# Patient Record
Sex: FEMALE | Race: WHITE | NOT HISPANIC OR LATINO | Employment: OTHER | URBAN - METROPOLITAN AREA
[De-identification: names, ages, dates, MRNs, and addresses within clinical notes are randomized per-mention and may not be internally consistent; named-entity substitution may affect disease eponyms.]

---

## 2024-01-10 ENCOUNTER — APPOINTMENT (OUTPATIENT)
Dept: RADIOLOGY | Facility: OTHER | Age: 69
End: 2024-01-10
Payer: MEDICARE

## 2024-01-10 ENCOUNTER — OFFICE VISIT (OUTPATIENT)
Dept: OBGYN CLINIC | Facility: OTHER | Age: 69
End: 2024-01-10
Payer: MEDICARE

## 2024-01-10 VITALS
DIASTOLIC BLOOD PRESSURE: 80 MMHG | BODY MASS INDEX: 27.16 KG/M2 | SYSTOLIC BLOOD PRESSURE: 128 MMHG | HEIGHT: 65 IN | HEART RATE: 66 BPM | WEIGHT: 163 LBS

## 2024-01-10 DIAGNOSIS — Z01.89 ENCOUNTER FOR LOWER EXTREMITY COMPARISON IMAGING STUDY: ICD-10-CM

## 2024-01-10 DIAGNOSIS — M25.562 LEFT KNEE PAIN, UNSPECIFIED CHRONICITY: ICD-10-CM

## 2024-01-10 DIAGNOSIS — M17.0 PRIMARY OSTEOARTHRITIS OF BOTH KNEES: ICD-10-CM

## 2024-01-10 DIAGNOSIS — M17.12 PRIMARY OSTEOARTHRITIS OF LEFT KNEE: Primary | ICD-10-CM

## 2024-01-10 PROCEDURE — 73564 X-RAY EXAM KNEE 4 OR MORE: CPT

## 2024-01-10 PROCEDURE — 99204 OFFICE O/P NEW MOD 45 MIN: CPT | Performed by: ORTHOPAEDIC SURGERY

## 2024-01-10 RX ORDER — CHLORHEXIDINE GLUCONATE ORAL RINSE 1.2 MG/ML
15 SOLUTION DENTAL ONCE
OUTPATIENT
Start: 2024-01-10 | End: 2024-01-10

## 2024-01-10 RX ORDER — FERROUS SULFATE 324(65)MG
324 TABLET, DELAYED RELEASE (ENTERIC COATED) ORAL
Qty: 60 TABLET | Refills: 0 | Status: SHIPPED | OUTPATIENT
Start: 2024-01-10

## 2024-01-10 RX ORDER — LEVOTHYROXINE SODIUM 0.1 MG/1
100 TABLET ORAL
COMMUNITY

## 2024-01-10 RX ORDER — HYDROCHLOROTHIAZIDE 12.5 MG/1
12.5 TABLET ORAL DAILY
COMMUNITY

## 2024-01-10 RX ORDER — MULTIVITAMIN
1 TABLET ORAL DAILY
Qty: 30 TABLET | Refills: 0 | Status: CANCELLED | OUTPATIENT
Start: 2024-01-10

## 2024-01-10 RX ORDER — MELATONIN
1000 DAILY
COMMUNITY

## 2024-01-10 RX ORDER — VALSARTAN 80 MG/1
80 TABLET ORAL DAILY
COMMUNITY

## 2024-01-10 RX ORDER — MULTIVITAMIN
1 TABLET ORAL DAILY
Qty: 30 TABLET | Refills: 0 | Status: SHIPPED | OUTPATIENT
Start: 2024-01-10

## 2024-01-10 RX ORDER — SEMAGLUTIDE 1.34 MG/ML
INJECTION, SOLUTION SUBCUTANEOUS
COMMUNITY

## 2024-01-10 RX ORDER — FOLIC ACID 1 MG/1
1 TABLET ORAL DAILY
Qty: 30 TABLET | Refills: 0 | Status: SHIPPED | OUTPATIENT
Start: 2024-01-10

## 2024-01-10 NOTE — PROGRESS NOTES
"Orthopaedic Surgery - Office Note  Thao Ford (68 y.o. female)   : 1955   MRN: [id number]  Encounter Date: 1/10/2024    Chief Complaint   Patient presents with    Left Knee - Pain     Left knee hurts more      Right Knee - Pain       Assessment / Plan  Bilateral knee OA, pain worse L>R    Patient is electing to move forward with Left TKA  The surgical risks, benefits and alternatives were reviewed   Consent was singed during the visit  Scheduled for 2024  Referral given to attend PT to learn hip, thigh and core strengthening   Activity as tolerated  Ice, heat and anti-inflammatories prn   Ordered and reviewed XR during the visit  Return for 2 week follwo up with [employer].    History of Present Illness  Thao Ford is a 68 y.o. female who presents for evaluation of bilateral knee pain with known OA. Patient self reports OA ,\"bone on bone\" and has known this for many years. She had them injected in  with very mild relief. She has not had any formal treatments since . She does work out on her own and does know the exercises to avoid.  She doesn't have constantly but notes increases pain with prolonged WB activity. She notes her left knee will buckle on her occasionally.     Review of Systems  Pertinent items are noted in HPI.  All other systems were reviewed and are negative.    Physical Exam  /80 (BP Location: Right arm, Patient Position: Sitting, Cuff Size: Standard)   Pulse 66   Ht 5' 4.5\" (1.638 m)   Wt 73.9 kg (163 lb)   BMI 27.55 kg/m²   Cons: Appears well.  No apparent distress.  Psych: Alert. Oriented x3.  Mood and affect normal.  Eyes: PERRLA, EOMI  Resp: Normal effort.  No audible wheezing or stridor.  CV: Palpable pulse.  No discernable arrhythmia.  No LE edema.  Lymph:  No palpable cervical, axillary, or inguinal lymphadenopathy.  Skin: Warm.  No palpable masses.  No visible lesions.  Neuro: Normal muscle tone.  Normal and symmetric DTR's.     Bilateral Knee " Exam  Alignment:   mild correctable genu varus.  Inspection:  No swelling. No ecchymosis.  Palpation:  No tenderness. No effusion.  ROM:  Knee Extension 0. Knee Flexion 125.  Strength:  Able to actively extend knee against gravity.  Stability:  No objective knee instability. Stable Varus / Valgus stress, Lachman, and Posterior drawer.  Tests:  (-) Venkat.  Patella:  Patella tracks centrally with crepitus.  Neurovascular:  Sensation intact in DP/SP/Montoya/Sa/T nerve distributions.  2+ DP & PT pulses.  Gait:  Normal.     Studies Reviewed  I have personally reviewed pertinent films in PACS.  XR of left knee - images from 01/10/2024 showing severe narrowing of the medial joint space in the left knee, moderate narrowing of the right knee medial joint space, bone spurring of the patella seen bilaterally     Procedures  No procedures today.    Medical, Surgical, Family, and Social History  The patient's medical history, family history, and social history, were reviewed and updated as appropriate.    Past Medical History:   Diagnosis Date    Hypertension        No past surgical history on file.    No family history on file.    Social History     Occupational History    Not on file   Tobacco Use    Smoking status: Not on file    Smokeless tobacco: Not on file   Substance and Sexual Activity    Alcohol use: Not on file    Drug use: Not on file    Sexual activity: Not on file       Allergies   Allergen Reactions    Epinephrine Palpitations     Racey heart    Gluten Meal - Food Allergy Diarrhea    Allopurinol Other (See Comments)    Bactrim [Sulfamethoxazole-Trimethoprim] Other (See Comments)     Allergy from 23 years ago, doesn't remember reaction    Doxycycline Other (See Comments)         Current Outpatient Medications:     cholecalciferol (VITAMIN D3) 1,000 units tablet, Take 1,000 Units by mouth daily, Disp: , Rfl:     hydrochlorothiazide (HYDRODIURIL) 12.5 mg tablet, Take 12.5 mg by mouth daily, Disp: , Rfl:      levothyroxine 100 mcg tablet, Take 100 mcg by mouth, Disp: , Rfl:     Ozempic, 1 MG/DOSE, 4 MG/3ML injection pen, INJECT 1MG UNDER THE SKIN EVERY WEEK, Disp: , Rfl:     semaglutide, 0.25 or 0.5 mg/dose, (Ozempic, 0.25 or 0.5 MG/DOSE,) 2 mg/1.5 mL injection pen, Inject 1 mg under the skin, Disp: , Rfl:     TURMERIC PO, Take 1 capsule by mouth daily, Disp: , Rfl:     valsartan (DIOVAN) 80 mg tablet, Take 80 mg by mouth daily, Disp: , Rfl:       Nkechi Vidal    Scribe Attestation      I,:  Nkechi Vidal am acting as a scribe while in the presence of the attending physician.:       I,:  Akhil Carvajal MD personally performed the services described in this documentation    as scribed in my presence.:

## 2024-01-11 LAB — HBA1C MFR BLD HPLC: 5.7 %

## 2024-01-12 ENCOUNTER — TELEPHONE (OUTPATIENT)
Dept: OBGYN CLINIC | Facility: HOSPITAL | Age: 69
End: 2024-01-12

## 2024-01-12 NOTE — TELEPHONE ENCOUNTER
Spoke to patient to let her know for pre-op testing there is no EKG ordered but she is seeing her cardiologist on 2/19 and if he would like to order one she will have it done. Dr. Small her cardiologist will fax OV note to my fax number which I provided to her. Any further questions she will call.

## 2024-01-12 NOTE — TELEPHONE ENCOUNTER
Caller: Patient     Doctor: Alena    Reason for call: Patient is calling to see if she can have an EKG during her appt with Dr Wade.    Call back#: [phone]

## 2024-01-16 ENCOUNTER — EVALUATION (OUTPATIENT)
Dept: PHYSICAL THERAPY | Facility: OTHER | Age: 69
End: 2024-01-16
Payer: MEDICARE

## 2024-01-16 DIAGNOSIS — M25.562 LEFT KNEE PAIN, UNSPECIFIED CHRONICITY: ICD-10-CM

## 2024-01-16 DIAGNOSIS — M17.0 PRIMARY OSTEOARTHRITIS OF BOTH KNEES: ICD-10-CM

## 2024-01-16 PROCEDURE — 97110 THERAPEUTIC EXERCISES: CPT

## 2024-01-16 PROCEDURE — 97161 PT EVAL LOW COMPLEX 20 MIN: CPT

## 2024-01-16 NOTE — PROGRESS NOTES
PT Evaluation     Today's date: 2024  Patient name: Thao Ford  : 1955  MRN: [id number]  Referring provider: Akhil Carvajal, *  Dx:   Encounter Diagnosis     ICD-10-CM    1. Left knee pain, unspecified chronicity  M25.562 Ambulatory Referral to Physical Therapy      2. Primary osteoarthritis of both knees  M17.0 Ambulatory Referral to Physical Therapy          Start Time: 0800  Stop Time: 0900  Total time in clinic (min): 60 minutes    Assessment  Assessment details: Thao Ford is a 68 y.o. female presenting to PT for 1 pre-operative visit for a left TKA.  Risk Assessment and Prediction Tool results reviewed. Patient reported functional outcome scores reviewed. Discussed DOS and patient's questions were answered to patient's satisfaction. Mobility/ROM results per above. Strength results per above. Balance/Gait (including Timed Up & Go) per above. Virtual Home Assessment was reviewed with patient. Home Preparation Checklist was reviewed with patient including identification of care partner and encouragement of single level set-up. Post-operative pain management expectations discussed to the patient's satisfaction. Patient demonstrated competence with immediate post-operative home exercise program.  Expectation is that patient will be d/c to home with outpatient PT and will benefit from PT to address gait/strength dysfunction along with knee Range of Motion deficits to return to Prior level of functioning.   Impairments: abnormal coordination, abnormal gait, abnormal muscle firing, abnormal muscle tone, abnormal or restricted ROM, abnormal movement, activity intolerance, impaired balance, impaired physical strength, lacks appropriate home exercise program, pain with function, weight-bearing intolerance and poor body mechanics    Symptom irritability: moderateUnderstanding of Dx/Px/POC: good   Prognosis: good    Goals  Short Term Goals: to be achieved by 4 weeks  1) Patient to be  independent with basic HEP.  2) Decrease pain by 4/10 at its worst.  3) Increase knee flexion ROM to 90 deg.  4) Increase knee extension ROM by > 5 deg.  5) Increase LE strength by 1/2 MMT grade in all deficient planes.  6) Patient to negotiate steps with a step-to pattern with use of HR.   7) Patient to report decreased sleep interruption secondary to pain.  8) Increase ambulatory tolerance by 20 min with LRAD.    Long Term Goals: to be achieved by discharge  1) FOTO equal to or greater than goal.  2) Patient to be independent with comprehensive HEP.  3) Decrease pain to 3/10 or less for improved quality of life.  4) Increase LE strength to 5/5 MMT grade in all planes to improve a/iadls.  5) Achieve full knee extension ROM to improve a/iadls.  6) Increase knee flexion ROM to 120 degrees minimum to improve a/iadls.  7) Patient to negotiate steps with a reciprocal pattern with use of Hr.  8) Increase ambulatory tolerance to 40 min to improve participation in social activities.  9) Patient to report no sleep interruption secondary to pain.      Plan  Plan details: Patient to continue with HEP prior to surgery, then resume 2x/week post-surgery, and attend physical therapy as needed prior to surgery for HEP review/progression.   Patient would benefit from: PT eval and skilled physical therapy  Planned modality interventions: cryotherapy, TENS, thermotherapy: hydrocollator packs and low level laser therapy  Planned therapy interventions: manual therapy, massage, joint mobilization, coordination, graded exercise, graded activity, functional ROM exercises, therapeutic exercise, therapeutic activities, patient education, neuromuscular re-education, home exercise program and flexibility  Frequency: 2x week  Duration in weeks: 10  Plan of Care beginning date: 1/16/2024  Plan of Care expiration date: 3/26/2024  Treatment plan discussed with: patient        Subjective Evaluation    History of Present Illness  Date of surgery:  3/12/24.  Mechanism of injury: Patient is a 68 y.o. female presenting to physical therapy with chief complaint of bilateral knee pain, with her left knee being more painful. Patient reports this episode of pain/symptoms has been going on for years. Patient reports also having occasional left hip pain which she feels could be due to walking with compensatory patterns when her knee is really in pain. She reports most difficulty with walking, stair negotiation, and ADL's. Patient reports she has been attending the gym and doing exercises and will keep up with her HEP prior to surgery.  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 Descending    5x STS: Painful - held  TUG: <10 seconds, no assistance or AD from standard chair        MMT         AROM         PROM    Hip       L     R         L       R          L         R   Flex. 3+ 3+       Extn. 3+ 3+       Abd. 3+ 3+       Add.         IR.         ER.         G. Max         G. Med         Iliop.         .         Knee         Extension 4 4 0 0     Flexion 4 4 125 130              Ankle         Dorsi Flexion 5 5       Plantar Flexion           Segmental mobility:   Patella: mild hypomobility             Precautions: Hx of cancer. LBP. Hx bunionectomy.    Post-Op HEP Handout  provided with [employer].    POC expires Unit limit Auth Expiration date PT/OT + Visit Limit?   3/26/24 N/A TBA BOMN                           Visit/Unit Tracking  AUTH Status:  Date 1/16              For secondary Used 1               Remaining  TBA                Visit 1 IE pre-op        Manuals 1/16/24        Knee PROM         Patellar mobs         Soft Tissue Deformation                           Assessment         Neuro Re-Ed         Quad Set HEP        Glute Set HEP        SAQ         LAQ HEP        Glute Bridge         Clamshell HEP        SLS         Ankle pump/ABC HEP        Hip abd HEP                                   Ther Ex         Bike         Leg Press         Alternating gastroc/soleus stretch slant board         Side Stepping         Heel slide HEP                                   Ther Activity         Step Ups         Step Downs         LSU         Squatting         Stair negotiation         Outcome Measures                                    Gait Training         Walker         SPC                           Modalities

## 2024-02-07 ENCOUNTER — TELEPHONE (OUTPATIENT)
Dept: OBGYN CLINIC | Facility: HOSPITAL | Age: 69
End: 2024-02-07

## 2024-02-07 LAB
DME PARACHUTE DELIVERY DATE REQUESTED: NORMAL
DME PARACHUTE ITEM DESCRIPTION: NORMAL
DME PARACHUTE ORDER STATUS: NORMAL
DME PARACHUTE SUPPLIER NAME: NORMAL
DME PARACHUTE SUPPLIER PHONE: NORMAL

## 2024-02-07 NOTE — TELEPHONE ENCOUNTER
Preoperative Elective Admission Assessment- spoke with patient     Living Situation:    Who does pt live with: spouse  What kind of home: ranch with logarry  How do they enter the home: front  How many levels in home: 2   # of steps to enter home: 1  # of steps to second floor: 12  Are there handrails: Yes  Are there landings: Yes  Sleeping arrangement: first/entry floor  Where is Bathroom: entry level  Where is the tub or shower: walk in shower with chair   Dogs or ther pets: cat     First Floor Setup:   Is there a bathroom: Yes  Where would pt sleep: recliner     DME: ordering RW today. PROVIDED [facility] [employer] NUMBER TO CONTACT: [phone]   We discussed clearing pathways in the home and making sure there is accessibly to use the walker, for example, removing throw rugs.      Patient's Current Level of Function: Ambulates: Independently    Post-op Caregiver: spouse  Caregiver Name and phone number for Inpatient discharge needs: Thaddeus () [phone]  Currently receive any HHC/aides/community supports: No     Post-op Transport: spouse  To/from hospital: spouse  To/from PT 2-3x/week: spouse  Uses community transport now: No     Outpatient Physical Therapy Site:  Site: [place]  pre and post-op appts scheduled? Yes     Medication Management: self  Preferred Pharmacy for Post-op Medications: [id number] - [place], [place] - [address] [[id number]]   Blood Management Vitamin Regimen: Pt confirms taking as prescribed  Post-op anticoagulant: to be determined by surgical team postoperatively     DC Plan: Pt plans to be discharged home      Barriers to DC identified preoperatively: none identified    BMI: 27.55      Patient Education:  Pt educated on post-op pain, early mobilization (POD0), LOS goals, OP PT goals, and preoperative bathing. Patient educated that our goal is to appropriately discharge patient based off their post-op function while striving to maintain maximal independence. The goal is to discharge  patient to home and for them to attend outpatient physical therapy.    Assigned to care team? Yes

## 2024-02-15 LAB
DME PARACHUTE DELIVERY DATE ACTUAL: NORMAL
DME PARACHUTE DELIVERY DATE REQUESTED: NORMAL
DME PARACHUTE ITEM DESCRIPTION: NORMAL
DME PARACHUTE ORDER STATUS: NORMAL
DME PARACHUTE SUPPLIER NAME: NORMAL
DME PARACHUTE SUPPLIER PHONE: NORMAL

## 2024-02-20 ENCOUNTER — APPOINTMENT (OUTPATIENT)
Dept: LAB | Age: 69
End: 2024-02-20

## 2024-02-20 ENCOUNTER — APPOINTMENT (OUTPATIENT)
Dept: LAB | Facility: HOSPITAL | Age: 69
End: 2024-02-20
Payer: MEDICARE

## 2024-02-20 DIAGNOSIS — M17.12 PRIMARY OSTEOARTHRITIS OF LEFT KNEE: Primary | ICD-10-CM

## 2024-02-20 PROBLEM — I10 ESSENTIAL HYPERTENSION: Status: ACTIVE | Noted: 2024-02-20

## 2024-02-20 PROBLEM — E06.3 HYPOTHYROIDISM DUE TO HASHIMOTO'S THYROIDITIS: Status: ACTIVE | Noted: 2024-02-20

## 2024-02-20 PROBLEM — C92.10 CML (CHRONIC MYELOCYTIC LEUKEMIA) (HCC): Status: ACTIVE | Noted: 2017-02-27

## 2024-02-20 PROBLEM — Z94.81 BONE MARROW TRANSPLANT STATUS (HCC): Status: ACTIVE | Noted: 2017-02-27

## 2024-02-20 PROBLEM — I35.8 AORTIC VALVE SCLEROSIS: Status: ACTIVE | Noted: 2017-08-28

## 2024-02-20 PROBLEM — E03.8 HYPOTHYROIDISM DUE TO HASHIMOTO'S THYROIDITIS: Status: ACTIVE | Noted: 2024-02-20

## 2024-02-20 LAB
ALBUMIN SERPL BCP-MCNC: 4.3 G/DL (ref 3.5–5)
ALP SERPL-CCNC: 68 U/L (ref 34–104)
ALT SERPL W P-5'-P-CCNC: 21 U/L (ref 7–52)
ANION GAP SERPL CALCULATED.3IONS-SCNC: 10 MMOL/L
APTT PPP: 24 SECONDS (ref 23–37)
AST SERPL W P-5'-P-CCNC: 18 U/L (ref 13–39)
BASOPHILS # BLD AUTO: 0.04 THOUSANDS/ÂΜL (ref 0–0.1)
BASOPHILS NFR BLD AUTO: 1 % (ref 0–1)
BILIRUB SERPL-MCNC: 0.71 MG/DL (ref 0.2–1)
BUN SERPL-MCNC: 16 MG/DL (ref 5–25)
CALCIUM SERPL-MCNC: 10.2 MG/DL (ref 8.4–10.2)
CHLORIDE SERPL-SCNC: 101 MMOL/L (ref 96–108)
CO2 SERPL-SCNC: 26 MMOL/L (ref 21–32)
CREAT SERPL-MCNC: 0.84 MG/DL (ref 0.6–1.3)
EOSINOPHIL # BLD AUTO: 0.08 THOUSAND/ÂΜL (ref 0–0.61)
EOSINOPHIL NFR BLD AUTO: 1 % (ref 0–6)
ERYTHROCYTE [DISTWIDTH] IN BLOOD BY AUTOMATED COUNT: 13.5 % (ref 11.6–15.1)
EST. AVERAGE GLUCOSE BLD GHB EST-MCNC: 114 MG/DL
GFR SERPL CREATININE-BSD FRML MDRD: 71 ML/MIN/1.73SQ M
GLUCOSE P FAST SERPL-MCNC: 92 MG/DL (ref 65–99)
HBA1C MFR BLD: 5.6 %
HCT VFR BLD AUTO: 41.5 % (ref 34.8–46.1)
HGB BLD-MCNC: 13.8 G/DL (ref 11.5–15.4)
IMM GRANULOCYTES # BLD AUTO: 0.01 THOUSAND/UL (ref 0–0.2)
IMM GRANULOCYTES NFR BLD AUTO: 0 % (ref 0–2)
INR PPP: 0.95 (ref 0.84–1.19)
LYMPHOCYTES # BLD AUTO: 2.46 THOUSANDS/ÂΜL (ref 0.6–4.47)
LYMPHOCYTES NFR BLD AUTO: 38 % (ref 14–44)
MCH RBC QN AUTO: 32.1 PG (ref 26.8–34.3)
MCHC RBC AUTO-ENTMCNC: 33.3 G/DL (ref 31.4–37.4)
MCV RBC AUTO: 97 FL (ref 82–98)
MONOCYTES # BLD AUTO: 0.51 THOUSAND/ÂΜL (ref 0.17–1.22)
MONOCYTES NFR BLD AUTO: 8 % (ref 4–12)
NEUTROPHILS # BLD AUTO: 3.44 THOUSANDS/ÂΜL (ref 1.85–7.62)
NEUTS SEG NFR BLD AUTO: 52 % (ref 43–75)
NRBC BLD AUTO-RTO: 0 /100 WBCS
PLATELET # BLD AUTO: 297 THOUSANDS/UL (ref 149–390)
PMV BLD AUTO: 9.9 FL (ref 8.9–12.7)
POTASSIUM SERPL-SCNC: 4.9 MMOL/L (ref 3.5–5.3)
PROT SERPL-MCNC: 7.2 G/DL (ref 6.4–8.4)
PROTHROMBIN TIME: 12.6 SECONDS (ref 11.6–14.5)
RBC # BLD AUTO: 4.3 MILLION/UL (ref 3.81–5.12)
SODIUM SERPL-SCNC: 137 MMOL/L (ref 135–147)
WBC # BLD AUTO: 6.54 THOUSAND/UL (ref 4.31–10.16)

## 2024-02-20 PROCEDURE — 85025 COMPLETE CBC W/AUTO DIFF WBC: CPT

## 2024-02-20 PROCEDURE — 85730 THROMBOPLASTIN TIME PARTIAL: CPT

## 2024-02-20 PROCEDURE — 36415 COLL VENOUS BLD VENIPUNCTURE: CPT

## 2024-02-20 PROCEDURE — 83036 HEMOGLOBIN GLYCOSYLATED A1C: CPT

## 2024-02-20 PROCEDURE — 80053 COMPREHEN METABOLIC PANEL: CPT

## 2024-02-20 PROCEDURE — 85610 PROTHROMBIN TIME: CPT

## 2024-02-20 RX ORDER — ACETAMINOPHEN 500 MG
500 TABLET ORAL
COMMUNITY

## 2024-02-20 NOTE — PRE-PROCEDURE INSTRUCTIONS
Pre-Surgery Instructions:   Medication Instructions    acetaminophen (TYLENOL) 500 mg tablet Uses PRN- OK to take day of surgery    ascorbic Acid (VITAMIN C) 500 MG CPCR Hold day of surgery.    cholecalciferol (VITAMIN D3) 1,000 units tablet Stop taking 7 days prior to surgery.    ferrous sulfate 324 (65 Fe) mg Hold day of surgery.    folic acid (KP Folic Acid) 1 mg tablet Hold day of surgery.    hydrochlorothiazide (HYDRODIURIL) 12.5 mg tablet Hold day of surgery.    Ibuprofen-diphenhydrAMINE Cit 200-38 MG TABS Stop taking 3 days prior to surgery.    levothyroxine 100 mcg tablet Take day of surgery.    Multiple Vitamin (multivitamin) tablet Hold day of surgery.    Ozempic, 1 MG/DOSE, 4 MG/3ML injection pen Stop taking 7 days prior to surgery.    TURMERIC PO Stop taking 7 days prior to surgery.    valsartan (DIOVAN) 80 mg tablet Hold day of surgery.   Ortho class complete.    Medication instructions for day surgery reviewed. Please use only a sip of water to take your instructed medications. Avoid all over the counter vitamins, supplements and NSAIDS for one week prior to surgery per anesthesia guidelines. Tylenol is ok to take as needed.     You will receive a call one business day prior to surgery with an arrival time and hospital directions. If your surgery is scheduled on a Monday, the hospital will be calling you on the Friday prior to your surgery. If you have not heard from anyone by 8pm, please call the hospital supervisor through the hospital  at [phone]. ([place] [phone] or [place] [phone]).    Do not eat or drink anything after midnight the night before your surgery, including candy, mints, lifesavers, or chewing gum. Do not drink alcohol 24hrs before your surgery. Try not to smoke at least 24hrs before your surgery.       Follow the pre surgery showering instructions as listed in the “My Surgical Experience Booklet” or otherwise provided by your surgeon's office. Do not use a  blade to shave the surgical area 1 week before surgery. It is okay to use a clean electric clippers up to 24 hours before surgery. Do not apply any lotions, creams, including makeup, cologne, deodorant, or perfumes after showering on the day of your surgery. Do not use dry shampoo, hair spray, hair gel, or any type of hair products.     No contact lenses, eye make-up, or artificial eyelashes. Remove nail polish, including gel polish, and any artificial, gel, or acrylic nails if possible. Remove all jewelry including rings and body piercing jewelry.     Wear causal clothing that is easy to take on and off. Consider your type of surgery.    Keep any valuables, jewelry, piercings at home. Please bring any specially ordered equipment (sling, braces) if indicated.    Arrange for a responsible person to drive you to and from the hospital on the day of your surgery. Please confirm the visitor policy for the day of your procedure when you receive your phone call with an arrival time.     Call the surgeon's office with any new illnesses, exposures, or additional questions prior to surgery.    Please reference your “My Surgical Experience Booklet” for additional information to prepare for your upcoming surgery.

## 2024-02-20 NOTE — PATIENT INSTRUCTIONS
Contact surgical nurse  navigator with any questions regarding preoperative plan or schedule.  Stop all over the counter supplements, herbal, naturopathic  medications for 1 week prior to surgery UNLESS prescribed by your surgeon  Hold NSAIDS (i.e. advil, alleve, motrin, ibuprofen, celebrex) minimum 7 days prior to surgery  Hold Asprin minimum 7 days prior to surgery  Recommend using Tylenol ( acetaminophen ) 500mg every eight hours during the first week post discharge in conjunction with any additional pain medicine prescribed by your surgeon  Use bowel medicines prescribed by your surgeon ( colace) daily post op during the first 1-2 weeks to avoid post operative constipation issues  Call [phone] with any post discharge concerns or medical issues  The morning of surgery take only the following medication with small sip of water: Levothyroxine  Hold semaglutide 1 week prior to surgery  Hold valsartan and hydrochlorothiazide morning of surgery

## 2024-02-20 NOTE — PROGRESS NOTES
Internal Medicine Pre-Operative Evaluation:     Reason for Visit: Pre-operative Evaluation for Risk Stratification and Optimization    Patient ID: Thao Ford is a 68 y.o. female.     Surgery: Arthroplasty of left knee  Referring Provider: Dr. Carvajal      Recommendations to Proceed withSurgery    Patient is considered to be Low risk for Medium risk procedure.     After evaluation and discussion with patient with emphasis that all surgery has some degree of inherent risk it is determined this procedure is of acceptable risk  medically.    Patient may proceed with planned procedure      Assessment    Pre-operative Medical Evaluation for planned surgery  Recommendations as listed in PLAN section below  Contact surgical nurse  navigator with any questions regarding preoperative plan or schedule.      Problem List Items Addressed This Visit          Unprioritized    Hypothyroidism due to Hashimoto's thyroiditis     Continue current thyroid supplement           Bone marrow transplant status (HCC)-ongoing follow-up with primary physician    Aortic valve sclerosis  Patient cleared by cardiology for surgery on 2/19/2024    CML (chronic myelocytic leukemia) (HCC)-history     Other Visit Diagnoses       Preop exam for internal medicine    -  Primary                 Plan:     1. Further preoperative workup as follows:   - none no further testing required may proceed with surgery    2. Medication Management/Recommendations:   - hold ACE / ARB morning of surgery unless given other instructions by your provider  - hold diuretic / water pill  morning of surgery unless given other instructions by your provider  - Insulin Management: Hold semaglutide 1 week prior to surgery  - avoid use of NSAID such as motrin, advil, aleve for 7 days prior to surgery  - hold all OTC herbal or nutritional supplements 7 days before surgery    3. Patient requires further consultation with:   No Consults Required    4. Discharge Planning /  "Barriers to Discharge  none identified - patients has post discharge therapy plan in place, transportation arranged for discharge day, adequate family support at home to assist with discharge to home.        Subjective:           History of Present Illness:     Thao Ford is a 68 y.o. female who presents to the office today for a preoperative consultation at the request of surgeon. The patient understands this is an elective procedure and not emergent. They are electing to undergo planned procedure with an understanding that all surgery has inherent risk. They have worked with their surgeon and failed conservative treatment measures. Today they present for preoperative risk assessment and recommendations for optimization in preparation for surgery.    Pt seen in surgical optimization center for upcoming proposed surgery. They have failed previous conservative measures and have elected surgical intervention.     Pt meets presurgical lab and BMI optimization goals.    Upon interview questioning patient is able to perform greater than 4 METs workload in daily life without any reported cardio-pulmonary symptoms.        ROS: No TIA's or unusual headaches, no dysphagia.  No prolonged cough. No dyspnea or chest pain on exertion.  No abdominal pain, change in bowel habits, black or bloody stools.  No urinary tract or BPH symptoms.  Positive reported pain in arthritic joint. Positive difficulty with gait. No skin rashes or issues.      Objective:    /68   Pulse 80   Ht 5' 4.5\" (1.638 m)   Wt 73.9 kg (163 lb)   BMI 27.55 kg/m²       General Appearance: no distress, conversive  HEENT: PERRLA, conjuctiva normal; oropharynx clear; mucous membranes moist;   Neck:  Supple, no lymphadenopathy or thyromegaly  Lungs: breath sounds normal, normal respiratory effort, no retractions, expiratory effort normal  CV: normal heart sounds S1/S2, PMI normal   ABD: soft non tender, no masses , no hepatic or splenomegaly  EXT: DP " pulses intact, no lymphadenopathy, no edema  Skin: normal turgor, normal texture, no rash  Psych: affect normal, mood normal  Neuro: AAOx3        The following portions of the patient's history were reviewed and updated as appropriate: allergies, current medications, past family history, past medical history, past social history, past surgical history and problem list.     Past History:       Past Medical History:   Diagnosis Date   • CML (chronic myelocytic leukemia) (HCC) 2000   • Disease of thyroid gland    • GERD (gastroesophageal reflux disease)    • Herniated lumbar intervertebral disc     L5-S1   • History of transfusion    • Hypertension    • Septicemia (HCC)     during admission for stem cell transplant    Past Surgical History:   Procedure Laterality Date   • BUNIONECTOMY Left    •  SECTION     • COLONOSCOPY     • LIMBAL STEM CELL TRANSPLANT            Social History     Tobacco Use   • Smoking status: Never   • Smokeless tobacco: Never   Vaping Use   • Vaping status: Never Used   Substance Use Topics   • Alcohol use: Yes     Comment: rarely   • Drug use: Never     History reviewed. No pertinent family history.       Allergies:     Allergies   Allergen Reactions   • Epinephrine Palpitations     Racey heart   • Gluten Meal - Food Allergy Diarrhea   • Bactrim [Sulfamethoxazole-Trimethoprim] Other (See Comments)     Allergy from 23 years ago, doesn't remember reaction   • Doxycycline Facial Swelling   • Allopurinol Rash   • Sulfites - Food Allergy Rash        Current Medications:     Current Outpatient Medications   Medication Instructions   • acetaminophen (TYLENOL) 500 mg, Oral, Daily at bedtime PRN   • ascorbic Acid (VITAMIN C) 500 mg, Oral, Daily   • cholecalciferol 2,000 Units, Oral, Daily   • ferrous sulfate 324 mg, Oral, 2 times daily before meals   • folic acid (KP FOLIC ACID) 1 mg, Oral, Daily   • hydroCHLOROthiazide 12.5 mg, Oral, Daily   • Ibuprofen-diphenhydrAMINE Cit 200-38 MG  "TABS Daily at bedtime PRN   • levothyroxine 100 mcg, Oral, Daily   • Multiple Vitamin (multivitamin) tablet 1 tablet, Oral, Daily   • Ozempic, 1 MG/DOSE, 4 MG/3ML injection pen INJECT 1MG UNDER THE SKIN EVERY WEEK   • semaglutide (0.25 or 0.5 mg/dose) (OZEMPIC (0.25 OR 0.5 MG/DOSE)) 1 mg, Subcutaneous   • TURMERIC PO 1 capsule, Oral, Daily   • valsartan (DIOVAN) 80 mg, Oral, Daily           PRE-OP WORKSHEET DATA    Assessment of Pre-Operative Risks     MLJ Quality Hard Stops:  BMI (<40) : Estimated body mass index is 27.55 kg/m² as calculated from the following:    Height as of this encounter: 5' 4.5\" (1.638 m).    Weight as of this encounter: 73.9 kg (163 lb).    Hgb ( >11):   Lab Results   Component Value Date    HGB 13.8 02/20/2024     HbA1c (<7.5) :   Lab Results   Component Value Date    HGBA1C 5.6 02/20/2024     GFR (>60) (Less then 45 = Nephrology consult):  CrCl cannot be calculated (Patient's most recent lab result is older than the maximum 7 days allowed.).      Active Decompensated Chronic Conditions which would delay surgery  No acutely decompensated medical issues such as recent CVA, MI, new onset arrhythmia, severe aortic stenosis, CHF, uncontrolled COPD       Exercise Capacity: (if less the 4 mets consider functional assessment via cardiac stress testing or consultation)    Able to walk 2 blocks without symptoms?: Yes  Able to walk 1 flights without symptoms?: Yes    Assessment of intra and post operative respiratory, hemodynamic and thrombotic risks     Prior Anesthesia Reactions: No     Personal history of venous thromboembolic disease? No    History of steroid use > 5 mg for >2 weeks within last year? No      The patient's risk factors for cardiac complications include :  none    Thao Ford has an IN HOSPITAL cardiac risk of RCI RISK CLASS I (0 risk factors, risk of major cardiac compl. appr. 0.5%) based on RCRI calculator    Cardiac Risk Estimation: per the Revised Cardiac Risk Index (Circ. " 100:1043, 1999),        Pre-Op Data Reviewed:       Laboratory Results: I have personally reviewed the pertinent laboratory results/reports     EKG:I have personally reviewed pertinent reports.  . I personally reviewed and interpreted available tracings in the electronic medical record    Pending to be done at cardiac clearance appointment    OLD RECORDS: reviewed old records in the chart review section if EHR on day of visit.    Previous cardiopulmonary studies within the past year:  Echocardiogram: no  Cardiac Catheterization: no  Stress Test: no      Time of visit including pre-visit chart review, visit and post-visit coordination of plan and care , review of pre-surgical lab work, preparation and time spent documenting note in electronic medical record, time spent face-to-face in physical examination answering patient questions by care team 35 minutes             [facility]

## 2024-02-21 ENCOUNTER — ANESTHESIA EVENT (OUTPATIENT)
Age: 69
End: 2024-02-21
Payer: MEDICARE

## 2024-02-28 ENCOUNTER — OFFICE VISIT (OUTPATIENT)
Age: 69
End: 2024-02-28
Payer: MEDICARE

## 2024-02-28 VITALS
HEART RATE: 80 BPM | HEIGHT: 65 IN | BODY MASS INDEX: 27.16 KG/M2 | DIASTOLIC BLOOD PRESSURE: 68 MMHG | SYSTOLIC BLOOD PRESSURE: 115 MMHG | WEIGHT: 163 LBS

## 2024-02-28 DIAGNOSIS — Z94.81 BONE MARROW TRANSPLANT STATUS (HCC): ICD-10-CM

## 2024-02-28 DIAGNOSIS — E03.8 HYPOTHYROIDISM DUE TO HASHIMOTO'S THYROIDITIS: ICD-10-CM

## 2024-02-28 DIAGNOSIS — Z01.818 PREOP EXAM FOR INTERNAL MEDICINE: Primary | ICD-10-CM

## 2024-02-28 DIAGNOSIS — E06.3 HYPOTHYROIDISM DUE TO HASHIMOTO'S THYROIDITIS: ICD-10-CM

## 2024-02-28 DIAGNOSIS — I35.8 AORTIC VALVE SCLEROSIS: ICD-10-CM

## 2024-02-28 DIAGNOSIS — C92.10 CML (CHRONIC MYELOCYTIC LEUKEMIA) (HCC): ICD-10-CM

## 2024-02-28 DIAGNOSIS — M17.12 PRIMARY OSTEOARTHRITIS OF LEFT KNEE: ICD-10-CM

## 2024-02-28 PROCEDURE — 99214 OFFICE O/P EST MOD 30 MIN: CPT | Performed by: INTERNAL MEDICINE

## 2024-03-04 ENCOUNTER — APPOINTMENT (OUTPATIENT)
Dept: LAB | Age: 69
End: 2024-03-04
Payer: MEDICARE

## 2024-03-04 DIAGNOSIS — Z01.818 PRE-OP TESTING: ICD-10-CM

## 2024-03-04 PROCEDURE — 86901 BLOOD TYPING SEROLOGIC RH(D): CPT | Performed by: ORTHOPAEDIC SURGERY

## 2024-03-04 PROCEDURE — 86850 RBC ANTIBODY SCREEN: CPT | Performed by: ORTHOPAEDIC SURGERY

## 2024-03-04 PROCEDURE — 86900 BLOOD TYPING SEROLOGIC ABO: CPT | Performed by: ORTHOPAEDIC SURGERY

## 2024-03-04 PROCEDURE — 36415 COLL VENOUS BLD VENIPUNCTURE: CPT

## 2024-03-05 ENCOUNTER — LAB REQUISITION (OUTPATIENT)
Dept: LAB | Facility: HOSPITAL | Age: 69
End: 2024-03-05
Payer: MEDICARE

## 2024-03-05 DIAGNOSIS — Z01.818 ENCOUNTER FOR OTHER PREPROCEDURAL EXAMINATION: ICD-10-CM

## 2024-03-05 LAB
ABO GROUP BLD: NORMAL
BLD GP AB SCN SERPL QL: NEGATIVE
RH BLD: POSITIVE
SPECIMEN EXPIRATION DATE: NORMAL

## 2024-03-12 ENCOUNTER — ANESTHESIA (OUTPATIENT)
Age: 69
End: 2024-03-12
Payer: MEDICARE

## 2024-03-12 ENCOUNTER — HOSPITAL ENCOUNTER (OUTPATIENT)
Age: 69
Setting detail: OUTPATIENT SURGERY
Discharge: HOME | End: 2024-03-12
Attending: ORTHOPAEDIC SURGERY | Admitting: ORTHOPAEDIC SURGERY
Payer: MEDICARE

## 2024-03-12 VITALS
HEIGHT: 64 IN | SYSTOLIC BLOOD PRESSURE: 112 MMHG | WEIGHT: 163 LBS | DIASTOLIC BLOOD PRESSURE: 64 MMHG | HEART RATE: 88 BPM | BODY MASS INDEX: 27.83 KG/M2 | TEMPERATURE: 98.4 F | OXYGEN SATURATION: 99 % | RESPIRATION RATE: 18 BRPM

## 2024-03-12 DIAGNOSIS — M17.12 PRIMARY OSTEOARTHRITIS OF LEFT KNEE: Primary | ICD-10-CM

## 2024-03-12 DIAGNOSIS — Z01.818 PRE-OP TESTING: ICD-10-CM

## 2024-03-12 PROCEDURE — C9290 INJ, BUPIVACAINE LIPOSOME: HCPCS | Performed by: ANESTHESIOLOGY

## 2024-03-12 PROCEDURE — 97166 OT EVAL MOD COMPLEX 45 MIN: CPT

## 2024-03-12 PROCEDURE — C1776 JOINT DEVICE (IMPLANTABLE): HCPCS | Performed by: ORTHOPAEDIC SURGERY

## 2024-03-12 PROCEDURE — 97163 PT EVAL HIGH COMPLEX 45 MIN: CPT

## 2024-03-12 PROCEDURE — 97535 SELF CARE MNGMENT TRAINING: CPT

## 2024-03-12 PROCEDURE — C1713 ANCHOR/SCREW BN/BN,TIS/BN: HCPCS | Performed by: ORTHOPAEDIC SURGERY

## 2024-03-12 PROCEDURE — 97530 THERAPEUTIC ACTIVITIES: CPT

## 2024-03-12 PROCEDURE — 27447 TOTAL KNEE ARTHROPLASTY: CPT | Performed by: PHYSICIAN ASSISTANT

## 2024-03-12 PROCEDURE — S2900 ROBOTIC SURGICAL SYSTEM: HCPCS | Performed by: ORTHOPAEDIC SURGERY

## 2024-03-12 PROCEDURE — 27447 TOTAL KNEE ARTHROPLASTY: CPT | Performed by: ORTHOPAEDIC SURGERY

## 2024-03-12 DEVICE — ATTUNE KNEE SYSTEM TIBIAL BASE AFFIXIUM FIXED BEARING SIZE 4
Type: IMPLANTABLE DEVICE | Site: KNEE | Status: FUNCTIONAL
Brand: ATTUNE AFFIXIUM

## 2024-03-12 DEVICE — ATTUNE PATELLA MEDIALIZED DOME 38MM CEMENTED AOX
Type: IMPLANTABLE DEVICE | Site: KNEE | Status: FUNCTIONAL
Brand: ATTUNE

## 2024-03-12 DEVICE — ATTUNE KNEE SYSTEM TIBIAL INSERT FIXED BEARING POSTERIOR STABILIZED 4 6MM AOX
Type: IMPLANTABLE DEVICE | Site: KNEE | Status: FUNCTIONAL
Brand: ATTUNE

## 2024-03-12 DEVICE — ATTUNE FEMORAL POROCOAT POSTERIOR STABILIZED SIZE 4 LEFT CEMENTLESS
Type: IMPLANTABLE DEVICE | Site: KNEE | Status: FUNCTIONAL
Brand: ATTUNE

## 2024-03-12 DEVICE — DEPUY CMW 2 FAST SET BONE CEMENT 20G: Type: IMPLANTABLE DEVICE | Site: KNEE | Status: FUNCTIONAL

## 2024-03-12 RX ORDER — SODIUM CHLORIDE, SODIUM LACTATE, POTASSIUM CHLORIDE, CALCIUM CHLORIDE 600; 310; 30; 20 MG/100ML; MG/100ML; MG/100ML; MG/100ML
INJECTION, SOLUTION INTRAVENOUS CONTINUOUS PRN
Status: DISCONTINUED | OUTPATIENT
Start: 2024-03-12 | End: 2024-03-12

## 2024-03-12 RX ORDER — ASPIRIN 81 MG/1
81 TABLET ORAL 2 TIMES DAILY
Qty: 28 TABLET | Refills: 0 | Status: SHIPPED | OUTPATIENT
Start: 2024-03-12 | End: 2024-03-12

## 2024-03-12 RX ORDER — PANTOPRAZOLE SODIUM 40 MG/1
40 TABLET, DELAYED RELEASE ORAL
Status: DISCONTINUED | OUTPATIENT
Start: 2024-03-13 | End: 2024-03-12 | Stop reason: HOSPADM

## 2024-03-12 RX ORDER — ONDANSETRON 2 MG/ML
4 INJECTION INTRAMUSCULAR; INTRAVENOUS EVERY 6 HOURS PRN
Status: DISCONTINUED | OUTPATIENT
Start: 2024-03-12 | End: 2024-03-12 | Stop reason: HOSPADM

## 2024-03-12 RX ORDER — ONDANSETRON 2 MG/ML
4 INJECTION INTRAMUSCULAR; INTRAVENOUS ONCE AS NEEDED
Status: DISCONTINUED | OUTPATIENT
Start: 2024-03-12 | End: 2024-03-12 | Stop reason: HOSPADM

## 2024-03-12 RX ORDER — HYDROMORPHONE HCL/PF 1 MG/ML
0.5 SYRINGE (ML) INJECTION EVERY 2 HOUR PRN
Status: DISCONTINUED | OUTPATIENT
Start: 2024-03-12 | End: 2024-03-12 | Stop reason: HOSPADM

## 2024-03-12 RX ORDER — FERROUS SULFATE 325(65) MG
325 TABLET ORAL 2 TIMES DAILY WITH MEALS
Status: DISCONTINUED | OUTPATIENT
Start: 2024-03-12 | End: 2024-03-12 | Stop reason: HOSPADM

## 2024-03-12 RX ORDER — FOLIC ACID 1 MG/1
1 TABLET ORAL DAILY
Status: DISCONTINUED | OUTPATIENT
Start: 2024-03-12 | End: 2024-03-12 | Stop reason: HOSPADM

## 2024-03-12 RX ORDER — SIMETHICONE 80 MG
80 TABLET,CHEWABLE ORAL 4 TIMES DAILY PRN
Status: DISCONTINUED | OUTPATIENT
Start: 2024-03-12 | End: 2024-03-12 | Stop reason: HOSPADM

## 2024-03-12 RX ORDER — OXYCODONE HYDROCHLORIDE 5 MG/1
5 TABLET ORAL EVERY 4 HOURS PRN
Status: DISCONTINUED | OUTPATIENT
Start: 2024-03-12 | End: 2024-03-12 | Stop reason: HOSPADM

## 2024-03-12 RX ORDER — HYDROMORPHONE HCL IN WATER/PF 6 MG/30 ML
0.2 PATIENT CONTROLLED ANALGESIA SYRINGE INTRAVENOUS
Status: DISCONTINUED | OUTPATIENT
Start: 2024-03-12 | End: 2024-03-12 | Stop reason: HOSPADM

## 2024-03-12 RX ORDER — BUPIVACAINE HYDROCHLORIDE 5 MG/ML
INJECTION, SOLUTION EPIDURAL; INTRACAUDAL
Status: COMPLETED | OUTPATIENT
Start: 2024-03-12 | End: 2024-03-12

## 2024-03-12 RX ORDER — CEFAZOLIN SODIUM 1 G/50ML
1000 SOLUTION INTRAVENOUS EVERY 8 HOURS
Status: DISCONTINUED | OUTPATIENT
Start: 2024-03-12 | End: 2024-03-12 | Stop reason: HOSPADM

## 2024-03-12 RX ORDER — DIPHENHYDRAMINE HYDROCHLORIDE 50 MG/ML
12.5 INJECTION INTRAMUSCULAR; INTRAVENOUS ONCE AS NEEDED
Status: DISCONTINUED | OUTPATIENT
Start: 2024-03-12 | End: 2024-03-12 | Stop reason: HOSPADM

## 2024-03-12 RX ORDER — FENTANYL CITRATE/PF 50 MCG/ML
50 SYRINGE (ML) INJECTION
Status: DISCONTINUED | OUTPATIENT
Start: 2024-03-12 | End: 2024-03-12 | Stop reason: HOSPADM

## 2024-03-12 RX ORDER — CALCIUM CARBONATE 500 MG/1
1000 TABLET, CHEWABLE ORAL DAILY PRN
Status: DISCONTINUED | OUTPATIENT
Start: 2024-03-12 | End: 2024-03-12 | Stop reason: HOSPADM

## 2024-03-12 RX ORDER — MIDAZOLAM HYDROCHLORIDE 2 MG/2ML
INJECTION, SOLUTION INTRAMUSCULAR; INTRAVENOUS
Status: COMPLETED | OUTPATIENT
Start: 2024-03-12 | End: 2024-03-12

## 2024-03-12 RX ORDER — OXYCODONE HYDROCHLORIDE 10 MG/1
10 TABLET ORAL EVERY 4 HOURS PRN
Status: DISCONTINUED | OUTPATIENT
Start: 2024-03-12 | End: 2024-03-12 | Stop reason: HOSPADM

## 2024-03-12 RX ORDER — LIDOCAINE HYDROCHLORIDE 10 MG/ML
INJECTION, SOLUTION EPIDURAL; INFILTRATION; INTRACAUDAL; PERINEURAL AS NEEDED
Status: DISCONTINUED | OUTPATIENT
Start: 2024-03-12 | End: 2024-03-12

## 2024-03-12 RX ORDER — CEFAZOLIN SODIUM 1 G/50ML
1000 SOLUTION INTRAVENOUS ONCE
Status: COMPLETED | OUTPATIENT
Start: 2024-03-12 | End: 2024-03-12

## 2024-03-12 RX ORDER — GABAPENTIN 300 MG/1
300 CAPSULE ORAL
Status: DISCONTINUED | OUTPATIENT
Start: 2024-03-12 | End: 2024-03-12 | Stop reason: HOSPADM

## 2024-03-12 RX ORDER — CEPHALEXIN 500 MG/1
1000 CAPSULE ORAL EVERY 8 HOURS SCHEDULED
Qty: 4 CAPSULE | Refills: 0 | Status: SHIPPED | OUTPATIENT
Start: 2024-03-12 | End: 2024-03-12

## 2024-03-12 RX ORDER — ASPIRIN 81 MG/1
81 TABLET ORAL 2 TIMES DAILY
Qty: 60 TABLET | Refills: 0 | Status: SHIPPED | OUTPATIENT
Start: 2024-03-12 | End: 2024-04-11

## 2024-03-12 RX ORDER — LIDOCAINE HYDROCHLORIDE 10 MG/ML
0.5 INJECTION, SOLUTION EPIDURAL; INFILTRATION; INTRACAUDAL; PERINEURAL ONCE AS NEEDED
Status: DISCONTINUED | OUTPATIENT
Start: 2024-03-12 | End: 2024-03-12 | Stop reason: HOSPADM

## 2024-03-12 RX ORDER — METOCLOPRAMIDE HYDROCHLORIDE 5 MG/ML
10 INJECTION INTRAMUSCULAR; INTRAVENOUS ONCE AS NEEDED
Status: DISCONTINUED | OUTPATIENT
Start: 2024-03-12 | End: 2024-03-12 | Stop reason: HOSPADM

## 2024-03-12 RX ORDER — HYDROMORPHONE HCL/PF 1 MG/ML
0.5 SYRINGE (ML) INJECTION
Status: DISCONTINUED | OUTPATIENT
Start: 2024-03-12 | End: 2024-03-12 | Stop reason: HOSPADM

## 2024-03-12 RX ORDER — ACETAMINOPHEN 325 MG/1
650 TABLET ORAL EVERY 6 HOURS SCHEDULED
Status: DISCONTINUED | OUTPATIENT
Start: 2024-03-12 | End: 2024-03-12 | Stop reason: HOSPADM

## 2024-03-12 RX ORDER — PROPOFOL 10 MG/ML
INJECTION, EMULSION INTRAVENOUS CONTINUOUS PRN
Status: DISCONTINUED | OUTPATIENT
Start: 2024-03-12 | End: 2024-03-12

## 2024-03-12 RX ORDER — OXYCODONE HYDROCHLORIDE 5 MG/1
5 TABLET ORAL EVERY 4 HOURS PRN
Qty: 45 TABLET | Refills: 0 | Status: SHIPPED | OUTPATIENT
Start: 2024-03-12 | End: 2024-03-22

## 2024-03-12 RX ORDER — ONDANSETRON 4 MG/1
4 TABLET, ORALLY DISINTEGRATING ORAL EVERY 8 HOURS PRN
Qty: 15 TABLET | Refills: 0 | Status: SHIPPED | OUTPATIENT
Start: 2024-03-12

## 2024-03-12 RX ORDER — CEPHALEXIN 500 MG/1
1000 CAPSULE ORAL EVERY 12 HOURS SCHEDULED
Qty: 4 CAPSULE | Refills: 0 | Status: SHIPPED | OUTPATIENT
Start: 2024-03-12 | End: 2024-03-13

## 2024-03-12 RX ORDER — PROPOFOL 10 MG/ML
INJECTION, EMULSION INTRAVENOUS AS NEEDED
Status: DISCONTINUED | OUTPATIENT
Start: 2024-03-12 | End: 2024-03-12

## 2024-03-12 RX ORDER — ASCORBIC ACID 500 MG
500 TABLET ORAL 2 TIMES DAILY
Status: DISCONTINUED | OUTPATIENT
Start: 2024-03-12 | End: 2024-03-12 | Stop reason: HOSPADM

## 2024-03-12 RX ORDER — CHLORHEXIDINE GLUCONATE ORAL RINSE 1.2 MG/ML
15 SOLUTION DENTAL ONCE
Status: COMPLETED | OUTPATIENT
Start: 2024-03-12 | End: 2024-03-12

## 2024-03-12 RX ORDER — OXYCODONE HYDROCHLORIDE 5 MG/1
5 TABLET ORAL EVERY 4 HOURS PRN
Qty: 30 TABLET | Refills: 0 | Status: SHIPPED | OUTPATIENT
Start: 2024-03-12 | End: 2024-03-12

## 2024-03-12 RX ORDER — DEXAMETHASONE SODIUM PHOSPHATE 10 MG/ML
INJECTION, SOLUTION INTRAMUSCULAR; INTRAVENOUS AS NEEDED
Status: DISCONTINUED | OUTPATIENT
Start: 2024-03-12 | End: 2024-03-12

## 2024-03-12 RX ORDER — TRANEXAMIC ACID 10 MG/ML
1000 INJECTION, SOLUTION INTRAVENOUS ONCE
Status: COMPLETED | OUTPATIENT
Start: 2024-03-12 | End: 2024-03-12

## 2024-03-12 RX ORDER — SENNOSIDES 8.6 MG
1 TABLET ORAL DAILY
Status: DISCONTINUED | OUTPATIENT
Start: 2024-03-12 | End: 2024-03-12 | Stop reason: HOSPADM

## 2024-03-12 RX ORDER — TRAMADOL HYDROCHLORIDE 50 MG/1
50 TABLET ORAL EVERY 6 HOURS SCHEDULED
Status: DISCONTINUED | OUTPATIENT
Start: 2024-03-12 | End: 2024-03-12 | Stop reason: HOSPADM

## 2024-03-12 RX ORDER — DOCUSATE SODIUM 100 MG/1
100 CAPSULE, LIQUID FILLED ORAL 2 TIMES DAILY
Status: DISCONTINUED | OUTPATIENT
Start: 2024-03-12 | End: 2024-03-12 | Stop reason: HOSPADM

## 2024-03-12 RX ORDER — SODIUM CHLORIDE, SODIUM LACTATE, POTASSIUM CHLORIDE, CALCIUM CHLORIDE 600; 310; 30; 20 MG/100ML; MG/100ML; MG/100ML; MG/100ML
75 INJECTION, SOLUTION INTRAVENOUS CONTINUOUS
Status: DISCONTINUED | OUTPATIENT
Start: 2024-03-12 | End: 2024-03-12 | Stop reason: HOSPADM

## 2024-03-12 RX ORDER — ONDANSETRON 2 MG/ML
INJECTION INTRAMUSCULAR; INTRAVENOUS AS NEEDED
Status: DISCONTINUED | OUTPATIENT
Start: 2024-03-12 | End: 2024-03-12

## 2024-03-12 RX ORDER — ACETAMINOPHEN 325 MG/1
650 TABLET ORAL EVERY 4 HOURS PRN
Status: DISCONTINUED | OUTPATIENT
Start: 2024-03-12 | End: 2024-03-12 | Stop reason: HOSPADM

## 2024-03-12 RX ADMIN — BUPIVACAINE 15 ML: 13.3 INJECTION, SUSPENSION, LIPOSOMAL INFILTRATION at 10:03

## 2024-03-12 RX ADMIN — MIDAZOLAM 2 MG: 1 INJECTION INTRAMUSCULAR; INTRAVENOUS at 10:01

## 2024-03-12 RX ADMIN — LIDOCAINE HYDROCHLORIDE 3 ML: 10 INJECTION, SOLUTION EPIDURAL; INFILTRATION; INTRACAUDAL; PERINEURAL at 10:49

## 2024-03-12 RX ADMIN — DEXAMETHASONE SODIUM PHOSPHATE 10 MG: 10 INJECTION, SOLUTION INTRAMUSCULAR; INTRAVENOUS at 10:58

## 2024-03-12 RX ADMIN — PROPOFOL 20 MG: 10 INJECTION, EMULSION INTRAVENOUS at 10:55

## 2024-03-12 RX ADMIN — MEPIVACAINE HYDROCHLORIDE 3 ML: 15 INJECTION, SOLUTION EPIDURAL; INFILTRATION at 10:48

## 2024-03-12 RX ADMIN — CHLORHEXIDINE GLUCONATE 15 ML: 1.2 SOLUTION ORAL at 09:18

## 2024-03-12 RX ADMIN — BUPIVACAINE HYDROCHLORIDE 20 ML: 5 INJECTION, SOLUTION EPIDURAL; INTRACAUDAL at 10:05

## 2024-03-12 RX ADMIN — SODIUM CHLORIDE, SODIUM LACTATE, POTASSIUM CHLORIDE, AND CALCIUM CHLORIDE: .6; .31; .03; .02 INJECTION, SOLUTION INTRAVENOUS at 11:14

## 2024-03-12 RX ADMIN — SODIUM CHLORIDE, SODIUM LACTATE, POTASSIUM CHLORIDE, AND CALCIUM CHLORIDE: .6; .31; .03; .02 INJECTION, SOLUTION INTRAVENOUS at 12:09

## 2024-03-12 RX ADMIN — OXYCODONE HYDROCHLORIDE 5 MG: 5 TABLET ORAL at 14:41

## 2024-03-12 RX ADMIN — ONDANSETRON 4 MG: 2 INJECTION INTRAMUSCULAR; INTRAVENOUS at 10:58

## 2024-03-12 RX ADMIN — BUPIVACAINE HYDROCHLORIDE 10 ML: 5 INJECTION, SOLUTION EPIDURAL; INTRACAUDAL; PERINEURAL at 10:03

## 2024-03-12 RX ADMIN — PROPOFOL 80 MCG/KG/MIN: 10 INJECTION, EMULSION INTRAVENOUS at 10:50

## 2024-03-12 RX ADMIN — TRANEXAMIC ACID 1000 MG: 10 INJECTION, SOLUTION INTRAVENOUS at 10:49

## 2024-03-12 RX ADMIN — CEFAZOLIN SODIUM 1000 MG: 1 SOLUTION INTRAVENOUS at 10:40

## 2024-03-12 RX ADMIN — NOREPINEPHRINE BITARTRATE 1 MCG/MIN: 1 INJECTION, SOLUTION, CONCENTRATE INTRAVENOUS at 10:51

## 2024-03-12 RX ADMIN — SODIUM CHLORIDE, SODIUM LACTATE, POTASSIUM CHLORIDE, AND CALCIUM CHLORIDE: .6; .31; .03; .02 INJECTION, SOLUTION INTRAVENOUS at 09:11

## 2024-03-12 NOTE — INTERVAL H&P NOTE
H&P reviewed. After examining the patient I find no changes in the patients condition since the H&P had been written.    Vitals:    03/12/24 0935   BP: 105/58   Pulse: 65   Resp: 18   Temp:    SpO2: 97%

## 2024-03-12 NOTE — OCCUPATIONAL THERAPY NOTE
Occupational Therapy Evaluation     Patient Name: Thao Ford  Today's Date: 3/12/2024  Problem List  Principal Problem:    Primary osteoarthritis of left knee    Past Medical History  Past Medical History:   Diagnosis Date    CML (chronic myelocytic leukemia) (HCC) 2000    Disease of thyroid gland     GERD (gastroesophageal reflux disease)     Herniated lumbar intervertebral disc     L5-S1    History of transfusion     Hypertension     Septicemia (HCC)     during admission for stem cell transplant     Past Surgical History  Past Surgical History:   Procedure Laterality Date    BUNIONECTOMY Left      SECTION      COLONOSCOPY      LIMBAL STEM CELL TRANSPLANT  24 1610   OT Last Visit   OT Visit Date 24   Note Type   Note type Evaluation   Pain Assessment   Pain Assessment Tool 0-10   Pain Score No Pain   Restrictions/Precautions   Weight Bearing Precautions Per Order Yes   LLE Weight Bearing Per Order WBAT   Other Precautions WBS;Fall Risk   Home Living   Type of Home House  (1  with 1 ANNE (+ loft on second floor))   Home Layout Two level;Performs ADLs on one level;Able to live on main level with bedroom/bathroom   Bathroom Shower/Tub Walk-in shower   Bathroom Toilet Raised   Bathroom Equipment Built-in shower seat;Hand-held shower   Bathroom Accessibility Accessible   Home Equipment Reacher;Long-handled shoehorn   Prior Function   Level of [place] Independent with ADLs;Independent with functional mobility;Independent with IADLS   Lives With Spouse   Receives Help From Family   IADLs Independent with driving;Independent with meal prep;Independent with medication management   Falls in the last 6 months 0   Vocational Retired  (ICU RN)   Comments Pt lives in a 1  with 1 ANNE (has a loft on the second floor but does not need to access). Has a first floor set up with a master bedroom and bathroom equipped with a walk-in shower. Also has a recliner on the first  "floor. Did not use AD for functional mobility PTA.   Lifestyle   Autonomy IND for ADLs, IADLs, and functional mobility   Reciprocal Relationships Supportive  who is able to assist with all needs upon DC   Intrinsic Gratification Sing, read historical novels, travel   General   Family/Caregiver Present No   Subjective   Subjective \"My  can help me with anything.\"   ADL   Grooming Assistance 5  Supervision/Setup   Grooming Deficit Wash/dry hands;Supervision/safety;Standing with assistive device  (standing with RW sink side)   LB Dressing Assistance 5  Supervision/Setup   LB Dressing Deficit   (Seated EOB. Mod I via cross legged technique for managing R sock. Min A for managing socks on left foot. Offered LHAE training, however, pt politely declined reporting she prefers to have her  assist with LB ADLs.)   Toileting Assistance  5  Supervision/Setup   Toileting Deficit Supervison/safety;Clothing management up;Clothing management down;Perineal hygiene;Grab bar use  (Would benefit from bedside commode, however, pt politely declined preferring to use the sink top to assist with transfers.)   Bed Mobility   Supine to Sit 6  Modified independent   Additional items HOB elevated;Increased time required   Transfers   Sit to Stand 5  Supervision   Additional items Assist x 1;Increased time required   Stand to Sit 5  Supervision   Additional items Assist x 1;Increased time required   Toilet transfer 5  Supervision   Additional items Assist x 1   Additional Comments Cued for managing RW safely during turns; good carryover demo'ed.   Functional Mobility   Functional Mobility 5  Supervision   Additional Comments short distance mobility within hallway and to the bathroom   Additional items Rolling walker   Balance   Static Sitting Normal   Dynamic Sitting Normal   Static Standing Good   Dynamic Standing Fair +   Activity Tolerance   Activity Tolerance Patient tolerated treatment well   Medical Staff Made Aware " PT, Mauri   Nurse Made Aware Yes   RUE Assessment   RUE Assessment WFL   LUE Assessment   LUE Assessment WFL   Sensation   Light Touch No apparent deficits   Sharp/Dull No apparent deficits   Stereognosis No apparent deficits   Cognition   Overall Cognitive Status WFL   Arousal/Participation Alert;Cooperative   Attention Within functional limits   Orientation Level Oriented X4   Memory Within functional limits   Following Commands Follows all commands and directions without difficulty   Assessment   Limitation Decreased ADL status;Decreased endurance;Decreased high-level ADLs;Decreased self-care trans   Prognosis Good   Assessment Pt is a 68 y.o. female seen for OT evaluation s/p admit to [facility] on 3/12/2024 for elective surgical management of Primary dx: Primary osteoarthritis of left knee. Now, POD0 s/p L TKA w/ Robot (Dr. Carvajal, DOS: 3/12/24), WBS: WBAT and LLE. Significant pmhx per chart: Aortic valve sclerosis, HTN, hypothyroidism 2/2 Hashimoto's thyroiditis, b/l knee OA, bone marrow transplant status, chronic myelocytic leukemia.  Personal factors affecting pt at time of IE include: fall risk. Prior to admission, pt was IND with ADLs, IADLs, and functional mobility. Upon evaluation: Pt requires Supervision for ADLs and IADLs 2* the following deficits impacting occupational performance: decreased balance, impulsivity, and orthopedic restrictions. Pt to benefit from continued skilled OT tx while in the hospital to address deficits as defined above and maximize level of functional independence w ADL's and functional mobility. Occupational Performance areas to address include: toilet hygiene, dressing, functional mobility, community mobility, and clothing management. From OT standpoint, recommendation at time of d/c would be no further needs.   Plan   Treatment Interventions ADL retraining;Functional transfer training;UE strengthening/ROM;Patient/family training;Compensatory technique education;Continued  evaluation;Energy conservation;Activityengagement   Goal Expiration Date 03/16/24   OT Treatment Day 1   OT Frequency 1-2x/wk   Discharge Recommendation   Rehab Resource Intensity Level, OT No post-acute rehabilitation needs   Equipment Recommended Bedside commode   [facility] Daily Activity Inpatient   Lower Body Dressing 3   Bathing 3   Toileting 3   Upper Body Dressing 4   Grooming 4   Eating 4   Daily Activity Raw Score 21   Daily Activity Standardized Score (Calc for Raw Score >=11) 44.27   AM-PAC Applied Cognition Inpatient   Following a Speech/Presentation 4   Understanding Ordinary Conversation 4   Taking Medications 4   Remembering Where Things Are Placed or Put Away 4   Remembering List of 4-5 Errands 4   Taking Care of Complicated Tasks 4   Applied Cognition Raw Score 24   Applied Cognition Standardized Score 62.21   Additional Treatment Session   Start Time 1620   End Time 1630   Treatment Assessment Reviewed LHAE available for use, however, pt declined. Pt was educated about weightbearing status, role of OT, fall prevention and LB compensatory strategies with good carryover noted.   End of Consult   Education Provided Yes   Patient Position at End of Consult All needs within reach;Bed/Chair alarm activated  (seated on bed)     Vitals assessed while seated in bed: 121/58. 119/60 standing x 1 minute. Denied dizziness/SOB.     Assessment:  The patient's raw score on the AM-PAC Daily Activity Inpatient Short Form is 21. A raw score of less than 19 suggests the patient may benefit from discharge to home. Please refer to the recommendation of the Occupational Therapist for safe discharge planning. Pt seen for co-evaluation with skilled Physical Therapy due to clinically unstable presentation, medical complexity, fall risk, functional balance, limited activity tolerance which is a decline from PLOF and may impact overall safety.  Pt appeared to be functioning near IND baseline for ADLs and has support from   upon DC so pt is cleared from an OT standpoint.     GOALS:     Pt will improve activity tolerance to G for min 30 min txment sessions for increase engagement in functional tasks     Pt will complete bed mobility at a Mod I level w/ G balance/safety demonstrated to decrease caregiver assistance required      Pt will complete LB dressing/self care w/ mod I using adaptive device and DME as needed     Pt will complete toileting w/ mod I w/ G hygiene/thoroughness using DME as needed     Pt will improve functional transfers to Mod I on/off all surfaces using DME as needed w/ G balance/safety      Pt will improve functional mobility during ADL/IADL/leisure tasks to Mod I using DME as needed w/ G balance/safety      Pt will demonstrate G carryover of pt/caregiver education and training as appropriate w/o cues w/ good tolerance to increase safety during functional tasks     Pt will demonstrate 100% carryover of energy conservation techniques t/o functional I/ADL/leisure tasks w/o cues s/p skilled education to increase endurance during functional tasks     Pt will participate in simulated IADL management task to increase independence to Mod I w/ G safety and endurance     Pt will verbalize 3 potential fall hazards and identify appropriate compensatory techniques to decrease fall risk in home environment      Pt will increase standing tolerance to 10 mins with Good dynamic standing balance to increase safety during participation in ADLs     Joi Deng OTR/L

## 2024-03-12 NOTE — OP NOTE
OPERATIVE REPORT    PATIENT NAME: Thao Ford   :  1955  MRN: [id number]  Pt Location: [facility] OR ROOM 04    SURGERY DATE: 3/12/2024    SURGEON(S) and ROLE:  Primary: Akhil Carvajal MD  Assisting: Armani Rocha PA-C    NOTE:  The presence of a physician assistant was necessary to help with patient positioning, surgical exposure, wound retraction, wound closure, and other key portions of the procedure.  No qualified resident was available for this case.      PREOPERATIVE DIAGNOSES:  Left Knee Osteoarthritis    POSTOPERATIVE DIAGNOSES:  Same as Preoperative Diagnosis    PROCEDURES:  Left Total Knee Arthroplasty with Robotic Assist      ANESTHESIA TYPE:  Spinal    ANESTHESIA STAFF:   Anesthesiologist: Ramana Diamond MD  CRNA: Tiana Tsai CRNA    ESTIMATED BLOOD LOSS:  150 mL    TOURNIQUET TIME:  not used    PERIOPERATIVE ANTIBIOTICS:  cefazolin, 1 gram    IMPLANTS: Depuy Attune    Femur:  Size 4 PS cementless    Tibia:  Size 4 FB Affixium cementless    Patella: 38 mm oval dome cemented    Poly : 6 mm PS      Implant Name Type Inv. Item Serial No.  Lot No. LRB No. Used Action   DEPUY BONE CEMENT CMW2 - [id number]  DEPUY BONE CEMENT CMW2  DEPUY [id number] Left 1 Implanted   COMPONENT PATELLA 38MM MEDIAL DOME ATTUNE - [id number]  COMPONENT PATELLA 38MM MEDIAL DOME ATTUNE  DEPUY [id number] Left 1 Implanted   COMPONENT FEM SZ 4 LT CMNTLS POR PS ATTUNE - [id number]  COMPONENT FEM SZ 4 LT CMNTLS POR PS ATTUNE  DEPUY [id number] Left 1 Implanted   BASEPLATE TIBIAL SZ 4 FX BRNG  ATTUNE - [id number]  BASEPLATE TIBIAL SZ 4 FX BRNG  ATTUNE  DEPUY [id number] Left 1 Implanted   INSERT TIBIAL 6MM SZ 4 FB PS ATTUNE - [id number]  INSERT TIBIAL 6MM SZ 4 FB PS ATTUNE  DEPUY [id number] Left 1 Implanted       SPECIMENS:  * No specimens in log *    DRAINS:  None      OPERATIVE INDICATIONS:  The patient is a 68 y.o. female with left knee pain and severe osteoarthritis.  Surgical treatment was indicated due to  persistent symptoms despite non-surgical treatment.  After a thorough discussion of the potential risks, benefits, and alternative treatments, the patient agreed to proceed with surgery.  The patient understands that the risks of surgery include, but are not limited to: infection, neurovascular injury, wound healing complications, venous thromboembolism, persistent pain, stiffness, instability, recurrence of symptoms, potential need for additional surgeries, and loss of limb or life.  Oral and written consent for surgery was obtained from the patient preoperatively.    PROCEDURE AND TECHNIQUE:  On the day of surgery, the patient was identified in the preoperative holding area.  The operative site was marked by the surgeon.  The patient was taken into the operating room.  A time-out was conducted to confirm the patient's identity, the operative site, and the proposed procedure.  The patient was anesthetized, and perioperative antibiotics were administered.  The patient was positioned supine on the OR table.  All bony prominences were padded.  A tourniquet was not used.  The operative site was prepped and draped using standard sterile technique.      An anterior midline incision was carried sharply to the extensor mechanism.  Hemostasis was obtained with electrocautery.  A medial parapatellar arthrotomy was made, and the patella was subluxated laterally.  Severe tri-compartmental degenerative changes were noted.  The infrapatellar fat pad, anterior horns of the menisci, cruciate ligaments, and synovium over the anterior femur were excised.  The lateral patellofemoral ligament was divided.  A lateral retinacular release was not performed.  The medial soft tissue sleeve was elevated from the tibia to the mid-coronal plane.  Medial and lateral osteophytes were removed from the tibia and femur.    Two array drill pins were placed in the anteromedial proximal tibia, and two were placed in the anteromedial proximal femur.   The tibial and femoral arrays were clamped to the pins and aligned with the tibial and femoral long axes.  The Velys robot was positioned along the non-operative side of the OR table.  The arrays were registered with the camera, and the tibial and femoral checkpoints were acquired.  All required anatomical landmarks were acquired and verified.  The knee was then taken through a full range of motion to obtain the initial leg alignment and Accubalance graph.  The surgical plan was then formulated using the Proadjust software.        Medial and lateral Z-retractors were placed on the tibia.  A Patito retractor placed in the intercondylar notch was used to subluxate the tibia anteriorly.  The Aviir robotic-assisted device was positioned on the patient's operative side, and the tibial checkpoint was verified.  The tibial cut was made with 3 degrees of posterior slope and 0 degrees neutral in the coronal plane,  removing 2 mm from the medial plateau and 7.5 mm from the lateral plateau.    Meniscus remnants and posterior osteophytes were removed from the posterior recess.  A posterior capsular release was performed with a Kingston elevator.  The ligament tensor was placed, and a new Accubalance graph was obtained.  The surgical plan for the femoral component size and position was adjusted using the Proadjust software to optimize gap balancing and leg alignment.       The femoral checkpoint was verified.  The distal femoral cut was made in 2 degrees of varus to the mechanical axis, removing 8.5 mm of bone medially and 9 mm of bone laterally.  Femoral component rotation was set to 4.5 degrees of external rotation.  Femoral component sagittal plane alignment was set to 5 degrees of flexion and 1 mm of posterior translation.  This removed 10 mm of bone from the posteromedial femoral condyle and 6.5 mm of bone from the posterolateral femoral condyle.  The anterior, posterior, and chamfer cuts were then completed with the Aviir  robotic-assisted device.  Spacer blocks were used to verify balanced flexion and extension gaps and to determine polyethylene insert thickness.  The box cuts were made using the appropriate sized cutting guide.    Trial components were placed.  The trial liner was adjusted as necessary to provide appropriate soft tissue tension and knee range of motion.  The knee demonstrated excellent range of motion from full extension to 120 degrees of flexion and appropriate varus / valgus stability in full extension and mid-flexion.  A final Accubalance graph was obtained, demonstrating extension gaps of 2 mm medially and 3.5 mm laterally and flexion gaps of 1 medially and 3 mm laterally.  The final HKA was 2 degrees varus.  The patella was cut freehand, sized, and prepared to accept the patellar component.  A patellar trial was placed.  Patellar tracking was stable using the no-thumbs method.  Tibial component rotation was marked with electocautery.    The trial components were removed.  The tibial and femoral arrays and array drill pins were removed.  The tibia was exposed, sized, and prepared with the reamer and keel-punch.  Cement was mixed on the back table while the knee was irrigated with sterile saline.  The bone cuts were dried, and the tibial, femoral and patellar components were placed.  Compression was applied while the cement cured.  Excess cement was removed.  The polyethylene liner was placed.  Hemostasis was obtained with electrocautery.  The knee was again irrigated with sterile saline.  A Hemovac drain was not placed.  The arthrotomy and the deep fasica were closed with #1 Vicryl and #1 Stratafix sutures.  The skin was closed with 0 Vicryl, 2-0 Stratfix and 3-0 stratafix.  A sterile dressing was applied, and the drapes were removed.  The patient was awakened from anesthesia and taken to the PACU in stable condition.      COMPLICATIONS:  None    PATIENT DISPOSITION:  PACU       SIGNATURE:  Akhil Carvajal,  MD  DATE:  March 12, 2024  TIME:  7:35 PM

## 2024-03-12 NOTE — ANESTHESIA POSTPROCEDURE EVALUATION
Post-Op Assessment Note    CV Status:  Stable  Pain Score: 0 (T10 sensory level)    Pain management: adequate    Multimodal analgesia used between 6 hours prior to anesthesia start to PACU discharge    Mental Status:  Alert and awake   Hydration Status:  Euvolemic   PONV Controlled:  Controlled   Airway Patency:  Patent     Post Op Vitals Reviewed: Yes    No anethesia notable event occurred.    Staff: Anesthesiologist, CRNA               BP 92/51 (03/12/24 1240)    Temp 98.4 °F (36.9 °C) (03/12/24 1240)    Pulse 73 (03/12/24 1240)   Resp 16 (03/12/24 1240)    SpO2 98 % (03/12/24 1240)

## 2024-03-12 NOTE — DISCHARGE INSTR - AVS FIRST PAGE
POSTOPERATIVE INSTRUCTIONS following KNEE SURGERY    MEDICATIONS:  Resume all home medications unless otherwise instructed by your surgeon.  Pain Medication:  Oxycodone 5 mg, 1-2 tablets every 4 hours as needed  If you were given a regional anesthetic (nerve block), please begin taking the pain medication as soon as you get home, even if you have minimal or no pain.  DO NOT WAIT FOR THE NERVE BLOCK TO WEAR OFF.  Possible side effects include nausea, constipation, and urinary retention.  If you experience these side effects, please call our office for assistance.  Pain med refills are authorized only during office hours (8am-4pm, Mon-Fri).  Antibiotic: Keflex 500 mg take 2 pills at 8 PM tonight and 2 pills at 8 AM tomorrow morning.  Take with food.  Stop if you experience nausea, reflux, or stomach pain.  Nausea Medication:  Zofran ODT 4 mg, 1 tablet every 6 hours as needed  Fill prescription ONLY if you expericnce severe nausea.  Blood Clot Prevention:  Aspirin 81 mg, 1 tablet twice daily for 30 days  Pump your foot up and down 20 times per hour while you are less mobile.    WOUND CARE:  Keep the dressing clean and dry.  Light drainage may occur the first 2 days postop.  Mepilex dressing with SAMI stockings over top for 7 days.  You can then remove the Mepilex/bandage leg dressing but continue with the SAMI stockings until seen in the office 2 weeks postoperatively.  Please call our office ([phone]) if you experience either of the following:  Sudden increase in swelling, redness, or warmth at the surgical site  Excessive incisional drainage that persists beyond the 3rd day after surgery  Oral temperature greater than 101 degrees, not relieved with Tylenol  Shortness of breath, chest pain, nausea, or any other concerning symptoms    SWELLING CONTROL:  Cold Therapy:  The cold therapy device may be used either continuously or only as needed, according to your preference.  Do not let the pad directly touch your skin.  " Alternatively, apply ice (20 min on, 20 min off) as often as you feel is necessary.  Elevation:  Elevate the entire leg above heart level.  Place pillows under your ankle to keep your knee straight.  Compression:  Apply ACE wraps or a thigh-length compression stocking as needed.    RANGE OF MOTION:  You are allowed FULL RANGE OF MOTION as tolerated.    IMMOBILIZATION:  None.  You are allowed full range of motion as tolerated.    ACTIVITY:   BEAR FULL WEIGHT AS TOLERATED on the operative leg.  Use crutches to assist only as needed.  Using Crutches on Stairs:  Going up, lead with your \"good\" (nonoperative) leg.  Going down, lead with your \"bad\" (operative) leg.  Use a hand rail when available.  Knee Extension:  Place a rolled towel or pillow under your ankle for 20-30 minutes 3-5 times per day.  This will help to maintain full knee extension.  Quad Sets:  Sit or lie with your knee straight.  Tighten your quadriceps (front thigh) muscle.  Hold for 3 seconds, then relax.  Repeat 20 times per hour while awake.    PHYSICAL THERAPY:  Begin therapy 5 TO 7 DAYS AFTER SURGERY.  You were given a prescription for therapy at your preoperative office visit.  If you do not have physical therapy scheduled yet, please call our office for assistance.    FOLLOW-UP APPOINTMENT:  2 weeks after surgery with:    Dr. Akhil Carvajal MD  [facility]  [address] [place] [address], [address] [facility], [place] [address]  [phone] ([place] Office)  [phone] (After-Hours)   "

## 2024-03-12 NOTE — CASE MANAGEMENT
Case Management Discharge Planning Note    Patient name Thao Ford  Location OR POOL/OR POOL MRN [id number]  : 1955 Date 3/12/2024       Current Admission Date: 3/12/2024  Current Admission Diagnosis:Primary osteoarthritis of left knee   Patient Active Problem List    Diagnosis Date Noted    Hypothyroidism due to Hashimoto's thyroiditis 2024    Essential hypertension 2024    Primary osteoarthritis of left knee 2024    Aortic valve sclerosis 2017    Bone marrow transplant status (HCC) 2017    CML (chronic myelocytic leukemia) (HCC) 2017      LOS (days): 0  Geometric Mean LOS (GMLOS) (days):   Days to GMLOS:     OBJECTIVE:            Current admission status: Outpatient Surgery   Preferred Pharmacy:   [facility]/pharmacy #[id number] - [place], [place] - [address]  [address]  [place] [address]  Phone: [phone] Fax: [phone]    Primary Care Provider: No primary care provider on file.    Primary Insurance: MEDICARE  Secondary Insurance: BLUE CROSS    DISCHARGE DETAILS:                      Additional Comments:  spoke with patient. She has a walker and outpatient physical therapy set up. No CM needs at this time.

## 2024-03-12 NOTE — PHYSICAL THERAPY NOTE
"PHYSICAL THERAPY EVALUATION    NAME:  Thao Ford  DATE: 24    AGE:   68 y.o.  Mrn:   [id number]  ADMIT DX:  Primary osteoarthritis of left knee [M17.12]    Patient Active Problem List   Diagnosis    Hypothyroidism due to Hashimoto's thyroiditis    Essential hypertension    Bone marrow transplant status (HCC)    Aortic valve sclerosis    CML (chronic myelocytic leukemia) (HCC)    Primary osteoarthritis of left knee       Past Medical History:   Diagnosis Date    CML (chronic myelocytic leukemia) (HCC) 2000    Disease of thyroid gland     GERD (gastroesophageal reflux disease)     Herniated lumbar intervertebral disc     L5-S1    History of transfusion     Hypertension     Septicemia (HCC)     during admission for stem cell transplant       Past Surgical History:   Procedure Laterality Date    BUNIONECTOMY Left      SECTION      COLONOSCOPY      LIMBAL STEM CELL TRANSPLANT         Imaging Studies:  No orders to display       Past Medical History:   Diagnosis Date    CML (chronic myelocytic leukemia) (HCC) 2000    Disease of thyroid gland     GERD (gastroesophageal reflux disease)     Herniated lumbar intervertebral disc     L5-S1    History of transfusion     Hypertension     Septicemia (HCC)     during admission for stem cell transplant     Length Of Stay: 0  Performed at least 2 patient identifiers during session: Name and Birthday  PHYSICAL THERAPY EVALUATION :        24 1648   PT Last Visit   PT Visit Date 24   Note Type   Note type Evaluation  (and treatment)   Pain Assessment   Pain Assessment Tool 0-10   Pain Score No Pain   Hospital Pain Intervention(s) Ambulation/increased activity;Repositioned;Cold applied   Restrictions/Precautions   Weight Bearing Precautions Per Order Yes   LLE Weight Bearing Per Order WBAT   Other Precautions WBS;Fall Risk;Pain   Home Living   Type of Home House  (ranch with loft, 1 \"small\" ANNE. 12+4 with R rail up to loft. Does not need to " "access loft.)   Home Layout Two level;Performs ADLs on one level;Able to live on main level with bedroom/bathroom   Bathroom Shower/Tub Walk-in shower   Bathroom Toilet Raised   Bathroom Equipment Built-in shower seat;Hand-held shower   Bathroom Accessibility Accessible   Home Equipment Walker   Additional Comments Lives with spouse who is able to assist upon d/c.   Prior Function   Level of [place] Independent with ADLs;Independent with functional mobility;Independent with IADLS   Lives With Spouse   Receives Help From Family   IADLs Independent with driving;Independent with meal prep;Independent with medication management   Falls in the last 6 months 0   Vocational Retired  (ICU RN)   Comments First floor setup with master bedroom, full bathroom with walk-in shower. Recliner couch on first floor also. PTA, pt reports functioning at I w/o AD, I w/ADLs, I w/ IADLs, (+) , and Spouse/Family able to assist upon d/c PRN   General   Additional Pertinent History POD0 s/p L TKA w/ Robot (Dr. Carvajal, DOS: 3/12/24), WBS: WBAT and LLE. Significant pmhx per chart: Aortic valve sclerosis, HTN, hypothyroidism 2/2 Hashimoto's thyroiditis, b/l  knee OA, bone marrow transplant status, chronic myelocytic leukemia.   Family/Caregiver Present No   Cognition   Overall Cognitive Status WFL   Arousal/Participation Alert   Attention Within functional limits   Orientation Level Oriented X4   Memory Within functional limits   Following Commands Follows all commands and directions without difficulty   Comments Pleasant and cooperative.   Subjective   Subjective \"I can go home now right?\"   RLE Assessment   RLE Assessment WFL  (4/5 throughout (+) crepitus L knee ext)   LLE Assessment   LLE Assessment X  (hip flx at least 3/5, AROM knee -5 to 90 flx)   Light Touch   RLE Light Touch Grossly intact   LLE Light Touch Grossly intact   Bed Mobility   Supine to Sit 6  Modified independent   Additional items HOB elevated;Increased time " required   Additional Comments HOB ~60 deg   Transfers   Sit to Stand 5  Supervision   Additional items Assist x 1;Verbal cues;Increased time required   Stand to Sit 5  Supervision   Additional items Increased time required;Verbal cues   Additional Comments Cued for safe technique/hand placement.   Ambulation/Elevation   Gait pattern Poor UE support;Improper Weight shift;Antalgic;Narrow СЕРГЕЙ;Decreased L stance;Step to;Step through pattern   Gait Assistance 5  Supervision   Additional items Assist x 1;Verbal cues   Assistive Device Rolling walker   Distance 135ft x1   Number of Stairs 1  (6 inch curb step)   Ambulation/Elevation Additional Comments initially semi-steady step to patterning without gross LOB. Trialed step through patterning with inc medial/lateral sway and unsteady without gross LOB.Cued for safe technique/proper sequencing/gait mechanics and proper stair negotiation technique with use of RW. Instructed on cont use of step to at this time. (-) Buckle. Antalgic patterning, denying inc pain.   Balance   Static Sitting Normal   Dynamic Sitting Normal   Static Standing Good   Dynamic Standing Fair -  (without RW, fair with RW)   Ambulatory Fair -  (RW)   Endurance Deficit   Endurance Deficit Yes   Endurance Deficit Description weakness, dec ambulatory distances vs baseline   Activity Tolerance   Activity Tolerance Patient tolerated treatment well  (Supine HR 81, /61 SpO2 95% RA, EOB HR 94 /58, stand 119/60, post-amb 115/67. Denied sxs throughout.)   Medical Staff Made Aware GIANNI Tamez   Nurse Made Aware RN cleared/updated Radha. Pain medications received prior to therapy.   Assessment   Prognosis Excellent   Problem List Decreased strength;Decreased range of motion;Impaired balance;Decreased mobility;Decreased safety awareness;Decreased skin integrity;Orthopedic restrictions;Pain   Assessment Pt is a 68 y.o. female y/o presenting [facility]  on 3/12/2024 for  elective surgical management of Primary dx: Primary osteoarthritis of left knee. Now, POD0 s/p L TKA w/ Robot (Dr. Carvajal, DOS: 3/12/24), WBS: WBAT and LLE. Significant pmhx per chart: Aortic valve sclerosis, HTN, hypothyroidism 2/2 Hashimoto's thyroiditis, b/l  knee OA, bone marrow transplant status, chronic myelocytic leukemia. PT consulted to assess strength/functional mobility, activity tolerance and d/c needs. Active PT orders and activity orders for Up with assistance, Activity Beginning POD1. PTA, pt reports functioning at I w/o AD for community distances, I w/ADLs, I w/ IADLs, (+) , and Spouse/Family able to assist upon d/c PRN. Pt greeted supine in bed. During PT IE, pt presenting with above (see flowsheet) outlined functional impairments and deficits that limit functional mobility and activity tolerance relative to baseline. Pt currently requires modified independent assistance for bed mobility, supervision assistance x1 for transfers, supervision assistance x1 for ambulation with Rolling Walker for unlimited household distances, and supervision assistance x1 for elevations. Pt currently demonstrating inc fall risk 2/2 impaired balance, use of ambulatory aid, and acuity of medical illness, recent orthopedic procedure.  Fall risk education provided with good understanding. VSS with slight drops in BP, however denied sxs throughout. Denied additional sxs throughout session. Recommend continued mobilization of pt with nsg staff as tolerated to prevent further decline in function. Pt will benefit from continued PT services to progress mobility independence necessary for return to PLOF. Based on pt presentation and impairments, pt would most appropriately benefit from d/c to home with level III (min) rehab intensity resources  and support of family/spouse  when medically appropriate. Pt seen for additional tx session following IE. Refer to assessment below.   Barriers to Discharge None   Goals   Patient  Goals To go home   STG Expiration Date 03/19/24   Short Term Goal #1 1).  Pt will perform bed mobility with Jessica demonstrating appropriate technique 100% of the time with bed flat in order to improve function.2)  Perform all transfers with Jessica demonstrating safe and appropriate technique 100% of the time in order to improve ability to negotiate safely in home environment.3) Amb with least restrictive AD > 200'x1 with mod I in order to demonstrate ability to negotiate in home environment.4)  Improve overall strength and balance 1/2 grade in order to optimize ability to perform functional tasks and reduce fall risk.5) Increase activity tolerance to 45 minutes in order to improve endurance to functional tasks. 6)  Negotiate 1 stairs using most appropriate technique and mod I in order to be able to negotiate safely in home environment. 7) PT for ongoing patient and family/caregiver education, DME needs and d/c planning in order to promote highest level of function in least restrictive environment.   PT Treatment Day 0   Plan   Treatment/Interventions Functional transfer training;LE strengthening/ROM;Elevations;Therapeutic exercise;Equipment eval/education;Bed mobility;Gait training;Compensatory technique education;Spoke to nursing;OT;Spoke to case management;Endurance training   PT Frequency Twice a day  (PRN)   Discharge Recommendation   Rehab Resource Intensity Level, PT III (Minimum Resource Intensity)  (as scheduled for OPPT)   Equipment Recommended Walker  (pt owns--Fitted to size)   Additional Comments The patient's AM-[facility] Basic Mobility Inpatient Short Form Raw Score is 20. A Raw score of greater than 16 suggests the patient may benefit from discharge to home. Please also refer to the recommendation of the Physical Therapist for safe discharge planning.   AM-[facility] Basic Mobility Inpatient   Turning in Flat Bed Without Bedrails 4   Lying on Back to Sitting on Edge of Flat Bed Without Bedrails 4   Moving Bed to  Chair 3   Standing Up From Chair Using Arms 3   Walk in Room 3   Climb 3-5 Stairs With Railing 3   Basic Mobility Inpatient Raw Score 20   Basic Mobility Standardized Score 43.99   Highest Level Of Mobility   -HLM Goal 6: Walk 10 steps or more   JH-HLM Achieved 7: Walk 25 feet or more   Additional Treatment Session   Start Time 1630   End Time 1648   Treatment Assessment Following IE, additional tx session performed with focus on functional transfers, gait training , HEP instruction, and PT self-care/home management training with edu on symptom management, progressive walking program, LE positioning to promote ROM per PT POC.  Pt with excellent tolerance to session with limitations of fatigue and weakness. Pt demonstrating progress towards functional goals. Currently requiring supervision-mod I to complete functional mobility with skilled cues for technique/safety during amb. Dec safety awareness with attempts to stand without use of RW resulting in self-corrected posterior LOB. Education provided and reviewed on fall risk. HEP and TKA edu handout provided. Denies reports of dizziness or SOB t/o session. Please refer to flowsheet for objective data above. Pt continues to demonstrate deficits relative to PLOF and would benefit from continued skilled PT services per POC to improve indep and safety with mobility. PT d/c recommendations: Anticipate d/c to home with level III (min) rehab intensity resources support of family/spouse at d/c when medically appropriate. Pt expresses no c/f d/c to home at this time.  [facility] 20.   Equipment Use Amb 60ft x1 with RW with steady, step through patterning without gross LOB. Intermittent cues for step length, proximity to RW and use of step to patterning for sx management, gait deviations noted. Multiple transfers initially supervision, progressed to mod I. Pt attempted to dynamic stand within RW without BUE support with SBA and experienced posterior LOB which was self-corrected.  Edu provided. HEP/TKA handouts provided and reviewed. All questions addressed. Returned supine in bed with ice applied at end of session. Instructed on leg over leg technique. RN aware.   Additional Treatment Day 1   Exercises   TKR   (provided in additional tx session.)   End of Consult   Patient Position at End of Consult Supine;All needs within reach;Other (comment)  (All questions/concerns addressed. Pt stable.)     Pt requires PT/OT co-eval due to medical complexity, safety concerns, fall risk, significant assistance with mobility and dec activity tolerance.     (Please find full objective findings from PT assessment regarding body systems outlined above).     Hx/personal factors: co-morbidities, inaccessible home, use of AD, pain, and fall risk  Examination: dec mobility, dec balance, dec endurance, dec amb, risk for falls, pain, impairements in locomotion, musculoskeletal, balance, endurance, posture, coordination, assessed/impairments of systems including multiple body structures involved; musculoskeletal (ROM, strength, posture, BMI, pain), neuromuscular (balance, gait, transfers, motor control and learning), joint integrity, integumentary (skin integrity, presence of scars or wounds, post-surgical incision), cardiopulmonary (vitals, edema), assessed cognition; activity limitations (difficulties executing an action); participation restrictions (problems associated w involvement in life situations), am-pac score suggesting inc supervision/assistance vs baseline, use of more restrictive AD.   Clinical: unpredictable (risk for falls, POD #0, and need for input for mobility technique/safety, unpredictable behaviors/impaired judgement)  Complexity: high     Mauri Nieves, PT,DPT   03/12/24

## 2024-03-12 NOTE — ANESTHESIA PROCEDURE NOTES
Peripheral Block    Patient location during procedure: holding area  Start time: 3/12/2024 10:03 AM  Reason for block: at surgeon's request and post-op pain management  Staffing  Performed by: Ramana Diamond MD  Authorized by: Ramana Diamond MD    Preanesthetic Checklist  Completed: patient identified, IV checked, site marked, risks and benefits discussed, surgical consent, monitors and equipment checked, pre-op evaluation and timeout performed  Peripheral Block  Patient position: supine  Prep: ChloraPrep  Patient monitoring: frequent blood pressure checks, continuous pulse oximetry and heart rate  Block type: Adductor Canal  Laterality: left  Injection technique: single-shot  Procedures: ultrasound guided, Ultrasound guidance required for the procedure to increase accuracy and safety of medication placement and decrease risk of complications.  Ultrasound permanent image savedbupivacaine (PF) (MARCAINE) 0.5 % injection 20 mL - Perineural   10 mL - 3/12/2024 10:03:00 AM  bupivacaine liposomal (EXPAREL) 1.3 % injection 20 mL - Perineural   15 mL - 3/12/2024 10:03:00 AM  midazolam (VERSED) injection 0.5 mg - Intravenous   2 mg - 3/12/2024 10:01:00 AM  Needle  Needle type: Stimuplex   Needle localization: anatomical landmarks and ultrasound guidance  Assessment  Injection assessment: incremental injection, frequent aspiration, injected with ease, negative aspiration, negative for heart rate change, no paresthesia on injection, no symptoms of intraneural/intravenous injection and needle tip visualized at all times  Paresthesia pain: none  Post-procedure:  site cleaned  patient tolerated the procedure well with no immediate complications          
Peripheral Block    Patient location during procedure: holding area  Start time: 3/12/2024 10:05 AM  Reason for block: at surgeon's request and post-op pain management  Staffing  Performed by: Ramana Diamond MD  Authorized by: Ramana Diamond MD    Preanesthetic Checklist  Completed: patient identified, IV checked, site marked, risks and benefits discussed, surgical consent, monitors and equipment checked, pre-op evaluation and timeout performed  Peripheral Block  Patient position: right lateral  Prep: ChloraPrep  Patient monitoring: frequent blood pressure checks, continuous pulse oximetry and heart rate  Block type: IPACK  Laterality: left  Injection technique: single-shot  Procedures: ultrasound guided, Ultrasound guidance required for the procedure to increase accuracy and safety of medication placement and decrease risk of complications.  Ultrasound permanent image savedbupivacaine (PF) (MARCAINE) 0.5 % injection 20 mL - Perineural   20 mL - 3/12/2024 10:05:00 AM  bupivacaine liposomal (EXPAREL) 1.3 % injection 20 mL - Perineural   5 mL - 3/12/2024 10:05:00 AM  Needle  Needle type: Stimuplex   Needle localization: anatomical landmarks and ultrasound guidance  Assessment  Injection assessment: incremental injection, frequent aspiration, injected with ease, negative aspiration, negative for heart rate change, no paresthesia on injection, no symptoms of intraneural/intravenous injection and needle tip visualized at all times  Paresthesia pain: none  Post-procedure:  site cleaned  patient tolerated the procedure well with no immediate complications          
Spinal Block    Patient location during procedure: OR  Start time: 3/12/2024 10:48 AM  Reason for block: at surgeon's request and primary anesthetic  Staffing  Performed by: Tiana Tsai CRNA  Authorized by: Ramana Diamond MD    Preanesthetic Checklist  Completed: patient identified, IV checked, site marked, risks and benefits discussed, surgical consent, monitors and equipment checked, pre-op evaluation and timeout performed  Spinal Block  Patient position: sitting  Prep: ChloraPrep  Patient monitoring: cardiac monitor and frequent blood pressure checks  Approach: midline  Location: L4-5  Injection technique: single-shot  Needle  Needle type: pencil-tip   Needle gauge: 25 G  Needle length: 10 cm  Assessment  Sensory level: T4  Injection Assessment:  negative aspiration for heme, no paresthesia on injection and positive aspiration for clear CSF.  Post-procedure:  adhesive bandage applied, pressure dressing applied, secured with tape, site cleaned and sterile dressing applied          
Patient requests all Lab, Cardiology, and Radiology Results on their Discharge Instructions

## 2024-03-12 NOTE — ANESTHESIA PREPROCEDURE EVALUATION
Procedure:  ARTHROPLASTY KNEE TOTAL W ROBOT (same day dc) (Left: Knee)    Relevant Problems   ANESTHESIA (within normal limits)      CARDIO   (+) Aortic valve sclerosis   (+) Essential hypertension      ENDO   (+) Hypothyroidism due to Hashimoto's thyroiditis      GI/HEPATIC (within normal limits)      /RENAL (within normal limits)      GYN (within normal limits)      HEMATOLOGY (within normal limits)      MUSCULOSKELETAL   (+) Primary osteoarthritis of left knee      NEURO/PSYCH (within normal limits)      PULMONARY (within normal limits)      Transplant   (+) Bone marrow transplant status (HCC)      Other   (+) CML (chronic myelocytic leukemia) (HCC)        Physical Exam    Airway    Mallampati score: II  TM Distance: >3 FB  Neck ROM: full     Dental   No notable dental hx     Cardiovascular  Rhythm: regular, Rate: normal, Cardiovascular exam normal    Pulmonary  Pulmonary exam normal Breath sounds clear to auscultation    Other Findings  post-pubertal.      Anesthesia Plan  ASA Score- 3     Anesthesia Type- spinal with ASA Monitors.         Additional Monitors:     Airway Plan:     Comment: Left adductor canal and IPACK blocks.       Plan Factors-Exercise tolerance (METS): >4 METS.    Chart reviewed. EKG reviewed. Imaging results reviewed. Existing labs reviewed. Patient summary reviewed.                  Induction- intravenous.    Postoperative Plan- Plan for postoperative opioid use.     Informed Consent- Anesthetic plan and risks discussed with patient.  I personally reviewed this patient with the CRNA. Discussed and agreed on the Anesthesia Plan with the CRNA..          Recent labs personally reviewed:  Lab Results   Component Value Date    WBC 6.54 02/20/2024    HGB 13.8 02/20/2024     02/20/2024     Lab Results   Component Value Date    K 4.9 02/20/2024    BUN 16 02/20/2024    CREATININE 0.84 02/20/2024     Lab Results   Component Value Date    PTT 24 02/20/2024      Lab Results   Component Value  Date    INR 0.95 02/20/2024       Blood type A    Lab Results   Component Value Date    HGBA1C 5.6 02/20/2024       I, Ramana Diamond MD, have personally seen and evaluated the patient prior to anesthetic care.  I have reviewed the pre-anesthetic record, and other medical records if appropriate to the anesthetic care.  If a CRNA is involved in the case, I have reviewed the CRNA assessment, if present, and agree. Risks/benefits and alternatives discussed with patient including possible PONV, sore throat, and possibility of rare anesthetic and surgical emergencies.

## 2024-03-12 NOTE — PLAN OF CARE
Problem: OCCUPATIONAL THERAPY ADULT  Goal: Performs self-care activities at highest level of function for planned discharge setting.  See evaluation for individualized goals.  Description: Treatment Interventions: ADL retraining, Functional transfer training, UE strengthening/ROM, Patient/family training, Compensatory technique education, Continued evaluation, Energy conservation, Activityengagement  Equipment Recommended: Bedside commode     See flowsheet documentation for full assessment, interventions and recommendations.   Outcome: Progressing  Note: Limitation: Decreased ADL status, Decreased endurance, Decreased high-level ADLs, Decreased self-care trans  Prognosis: Good  Assessment: Pt is a 68 y.o. female seen for OT evaluation s/p admit [facility] on 3/12/2024 for elective surgical management of Primary dx: Primary osteoarthritis of left knee. Now, POD0 s/p L TKA w/ Robot (Dr. Carvajal, DOS: 3/12/24), WBS: WBAT and LLE. Significant pmhx per chart: Aortic valve sclerosis, HTN, hypothyroidism 2/2 Hashimoto's thyroiditis, b/l knee OA, bone marrow transplant status, chronic myelocytic leukemia.  Personal factors affecting pt at time of IE include: fall risk. Prior to admission, pt was IND with ADLs, IADLs, and functional mobility. Upon evaluation: Pt requires Supervision for ADLs and IADLs 2* the following deficits impacting occupational performance: decreased balance, impulsivity, and orthopedic restrictions. Pt to benefit from continued skilled OT tx while in the hospital to address deficits as defined above and maximize level of functional independence w ADL's and functional mobility. Occupational Performance areas to address include: toilet hygiene, dressing, functional mobility, community mobility, and clothing management. From OT standpoint, recommendation at time of d/c would be no further needs.     Rehab Resource Intensity Level, OT: No post-acute rehabilitation needs

## 2024-03-12 NOTE — PLAN OF CARE
Problem: PHYSICAL THERAPY ADULT  Goal: Performs mobility at highest level of function for planned discharge setting.  See evaluation for individualized goals.  Description: Treatment/Interventions: Functional transfer training, LE strengthening/ROM, Elevations, Therapeutic exercise, Equipment eval/education, Bed mobility, Gait training, Compensatory technique education, Spoke to nursing, OT, Spoke to case management, Endurance training  Equipment Recommended: Walker (pt owns--Fitted to size)       See flowsheet documentation for full assessment, interventions and recommendations.  Outcome: Adequate for Discharge  Note: Prognosis: Excellent  Problem List: Decreased strength, Decreased range of motion, Impaired balance, Decreased mobility, Decreased safety awareness, Decreased skin integrity, Orthopedic restrictions, Pain  Assessment: Pt is a 68 y.o. female y/o presenting to [facility]  on 3/12/2024 for elective surgical management of Primary dx: Primary osteoarthritis of left knee. Now, POD0 s/p L TKA w/ Robot (Dr. Carvajal, DOS: 3/12/24), WBS: WBAT and LLE. Significant pmhx per chart: Aortic valve sclerosis, HTN, hypothyroidism 2/2 Hashimoto's thyroiditis, b/l  knee OA, bone marrow transplant status, chronic myelocytic leukemia. PT consulted to assess strength/functional mobility, activity tolerance and d/c needs. Active PT orders and activity orders for Up with assistance, Activity Beginning POD1. PTA, pt reports functioning at I w/o AD for community distances, I w/ADLs, I w/ IADLs, (+) , and Spouse/Family able to assist upon d/c PRN. Pt greeted supine in bed. During PT IE, pt presenting with above (see flowsheet) outlined functional impairments and deficits that limit functional mobility and activity tolerance relative to baseline. Pt currently requires modified independent assistance for bed mobility, supervision assistance x1 for transfers, supervision assistance x1 for  ambulation with Rolling Walker for unlimited household distances, and supervision assistance x1 for elevations. Pt currently demonstrating inc fall risk 2/2 impaired balance, use of ambulatory aid, and acuity of medical illness, recent orthopedic procedure.  Fall risk education provided with good understanding. VSS with slight drops in BP, however denied sxs throughout. Denied additional sxs throughout session. Recommend continued mobilization of pt with nsg staff as tolerated to prevent further decline in function. Pt will benefit from continued PT services to progress mobility independence necessary for return to PLOF. Based on pt presentation and impairments, pt would most appropriately benefit from d/c to home with level III (min) rehab intensity resources  and support of family/spouse  when medically appropriate. Pt seen for additional tx session following IE. Refer to assessment below.  Barriers to Discharge: None     Rehab Resource Intensity Level, PT: III (Minimum Resource Intensity) (as scheduled for OPPT)    See flowsheet documentation for full assessment.

## 2024-03-13 ENCOUNTER — TELEPHONE (OUTPATIENT)
Dept: OBGYN CLINIC | Facility: HOSPITAL | Age: 69
End: 2024-03-13

## 2024-03-13 NOTE — TELEPHONE ENCOUNTER
Caller: Patient- Ailyn Ford    Doctor: Dr Carvajal    Reason for call: Patient is calling in stating that she had surgery yesterday 3/12 on her left knee. Patient is stating that she is in a lot of pain as her left side- whole knee and thigh is swollen and she is having pain behind her left calf as well she is asking if this is normal or not? She has been getting up and trying to move around but it is very difficult for her to even bend it and her blood pressure has been low as well 90/56 pulse- 76/80 not having chest pains or anything like that. Patient did not take her blood pressure medication this morning due to it being so low, she just finds it odd that she is not even able to bend her leg very well, the joseph stocking might even be restricting her as well making it more swollen she is not sure. Patient is elevating and icing as well and not sure what else to do. She took one tablet of Tylenol today but was afraid to take the pain medication due to her blood pressure being so low. Please reach out to patient on what further she should do relating this.     Call back#: [phone]

## 2024-03-13 NOTE — TELEPHONE ENCOUNTER
Caller: Patient    Doctor: Alena    Reason for call:     Patient is returning nurses call, please give patient call back, she will have her phone by her.  Her surgery was on 3/12/24 for left total knee..    Call back#: [phone]

## 2024-03-13 NOTE — TELEPHONE ENCOUNTER
Patient contacted for a postoperative follow up assessment.  Patient states they has increased pain but it is relieved with medication regimen. Patient report increase in swelling in upper thigh and dressing is clean, dry and intact. Patient is icing the site regularly. PT 3/15 at 10AM.     We reviewed patients AVS medication list. Patient is taking Tylenol 500mg every 8 hours, Oxycodone 5mg every 4 hours, ASA 81mg BID, Colace 100mg BID. Patient has not yet had a BM but is passing gas.      Patient reports calf pain, denies redness and swelling behind the calf. Pt denies nausea, vomiting, abdominal pain, chest pain, shortness of breath, fever, dizziness. Patient confirmed post-op appointment with surgeon on 3/27 at 11AM .Patient does not have any other questions or concerns at this time. Pt was encouraged to call with any questions, concerns or issues.

## 2024-03-15 ENCOUNTER — OFFICE VISIT (OUTPATIENT)
Dept: PHYSICAL THERAPY | Facility: OTHER | Age: 69
End: 2024-03-15
Payer: MEDICARE

## 2024-03-15 DIAGNOSIS — M17.12 PRIMARY OSTEOARTHRITIS OF LEFT KNEE: Primary | ICD-10-CM

## 2024-03-15 DIAGNOSIS — Z96.652 S/P TOTAL KNEE ARTHROPLASTY, LEFT: ICD-10-CM

## 2024-03-15 DIAGNOSIS — G89.29 CHRONIC PAIN OF LEFT KNEE: ICD-10-CM

## 2024-03-15 DIAGNOSIS — M25.562 CHRONIC PAIN OF LEFT KNEE: ICD-10-CM

## 2024-03-15 PROCEDURE — 97110 THERAPEUTIC EXERCISES: CPT

## 2024-03-15 PROCEDURE — 97140 MANUAL THERAPY 1/> REGIONS: CPT

## 2024-03-15 PROCEDURE — 97530 THERAPEUTIC ACTIVITIES: CPT

## 2024-03-15 NOTE — PROGRESS NOTES
PT Evaluation     Today's date: 3/15/2024  Patient name: Thao Ford  : 1955  MRN: [id number]  Referring provider: Akhil Carvajal, *  Dx:   Encounter Diagnosis     ICD-10-CM    1. Primary osteoarthritis of left knee  M17.12       2. S/P total knee arthroplasty, left  Z96.652       3. Chronic pain of left knee  M25.562     G89.29         Patient treated by Clarence Glass [facility], supervised by and discussed with Lio Augustin PT, DPT.     Start Time: 1000  Stop Time: 1040  Total time in clinic (min): 40 minutes    Assessment  Assessment details: Post-Op 3/15/24: Thao Ford is a 68 y.o. female who presents with pain, decreased strength, decreased ROM, and ambulation deficits. Due to these impairments, Patient has difficulty performing a/iadls, recreational activities, and engaging in social activities. Patient's clinical presentation is consistent with their referring diagnosis of No diagnosis found.. Patient has been educated in post-op contraindications / precautions and wound care. Patient would benefit from skilled physical therapy to address their aforementioned impairments, improve their level of function and to improve their overall quality of life.      Pre-Op: Thao Ford is a 68 y.o. female presenting to PT for 1 pre-operative visit for a left TKA.  Risk Assessment and Prediction Tool results reviewed. Patient reported functional outcome scores reviewed. Discussed DOS and patient's questions were answered to patient's satisfaction. Mobility/ROM results per above. Strength results per above. Balance/Gait (including Timed Up & Go) per above. Virtual Home Assessment was reviewed with patient. Home Preparation Checklist was reviewed with patient including identification of care partner and encouragement of single level set-up. Post-operative pain management expectations discussed to the patient's satisfaction. Patient demonstrated competence with immediate post-operative home exercise  program.  Expectation is that patient will be d/c to home with outpatient PT and will benefit from PT to address gait/strength dysfunction along with knee Range of Motion deficits to return to Prior level of functioning.   Impairments: abnormal coordination, abnormal gait, abnormal muscle firing, abnormal muscle tone, abnormal or restricted ROM, abnormal movement, activity intolerance, impaired balance, impaired physical strength, lacks appropriate home exercise program, pain with function, weight-bearing intolerance and poor body mechanics    Symptom irritability: moderateUnderstanding of Dx/Px/POC: good   Prognosis: good    Goals  Short Term Goals: to be achieved by 4 weeks  1) Patient to be independent with basic HEP.  2) Decrease pain by 4/10 at its worst.  3) Increase knee flexion ROM to 90 deg.  4) Increase knee extension ROM by > 5 deg.  5) Increase LE strength by 1/2 MMT grade in all deficient planes.  6) Patient to negotiate steps with a step-to pattern with use of HR.   7) Patient to report decreased sleep interruption secondary to pain.  8) Increase ambulatory tolerance by 20 min with LRAD.    Long Term Goals: to be achieved by discharge  1) FOTO equal to or greater than goal > 56.  2) Patient to be independent with comprehensive HEP.  3) Decrease pain to 3/10 or less for improved quality of life.  4) Increase LE strength to 5/5 MMT grade in all planes to improve a/iadls.  5) Achieve full knee extension ROM to improve a/iadls.  6) Increase knee flexion ROM to 120 degrees minimum to improve a/iadls.  7) Patient to negotiate steps with a reciprocal pattern with use of Hr.  8) Increase ambulatory tolerance to 40 min to improve participation in social activities.  9) Patient to report no sleep interruption secondary to pain.      Plan  Plan details: Patient to continue with HEP prior to surgery, then resume 2x/week post-surgery, and attend physical therapy as needed prior to surgery for HEP  review/progression.   Patient would benefit from: PT eval and skilled physical therapy  Planned modality interventions: cryotherapy, TENS, thermotherapy: hydrocollator packs and low level laser therapy  Planned therapy interventions: manual therapy, massage, joint mobilization, coordination, graded exercise, graded activity, functional ROM exercises, therapeutic exercise, therapeutic activities, patient education, neuromuscular re-education, home exercise program and flexibility  Frequency: 1-2x's week.  Duration in visits: 20  Duration in weeks: 12  Plan of Care beginning date: 3/15/2024  Plan of Care expiration date: 6/7/2024  Treatment plan discussed with: patient        Subjective Evaluation    History of Present Illness  Date of surgery: 3/12/24.  Mechanism of injury: @ RE 3/15/24: Patient is 69 y/o female presenting to PT s/p L knee arthroplasty on 3/12/24. She presents ambulating with rolling walker with slowed gait speed and reports that she has been icing and working on edema management the past 3 days. She reports having intolerance to her prescribed medicine, but she still took it today to be able to participate in therapy. Reports spending her day in bed and recliner, although she still participates in HEP daily.      @ IE 1/16/24: Patient is a 68 y.o. female presenting to physical therapy with chief complaint of bilateral knee pain, with her left knee being more painful. Patient reports this episode of pain/symptoms has been going on for years. Patient reports also having occasional left hip pain which she feels could be due to walking with compensatory patterns when her knee is really in pain. She reports most difficulty with walking, stair negotiation, and ADL's. Patient reports she has been attending the gym and doing exercises and will keep up with her HEP prior to surgery.  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 Descending    5x STS: 15.4sec, + airex on chair with arms, BL UE support with RW  TU.63sec, + airex on chair with arms, BL UE support with RW        MMT         AROM         PROM    Hip       L     R         L       R          L         R   Flex. 3+ 3+       Extn. 3+ 3+       Abd. 3+ 3+       Add.         IR.         ER.         G. Max         G. Med         Iliop.         .         Knee         Extension 3 4 7 0     Flexion 3 4 35 130              Ankle         Dorsi Flexion 5 5       Plantar Flexion           Segmental mobility:   Patella: hypomobility             Precautions: Hx of cancer. LBP. Hx bunionectomy, sx on 3/12/24    Post-Op HEP Handout provided with marco antonio.    POC expires Unit limit Auth Expiration date PT/OT + Visit Limit?   24 N/A 4/15/24 BOMN                           Visit/Unit Tracking  AUTH Status:  Date 1/16 3/15             [id number] Used 1 1              Remaining  9 8               Visit 1 IE pre-op 2 RE post-op       Manuals 1/16/24 3/15/24       Knee PROM  HB/GJL       Patellar mobs         Soft Tissue Deformation                           Assessment         Neuro Re-Ed         Quad Set HEP        Glute Set HEP        SAQ         LAQ HEP        Glute Bridge         Clamshell HEP        SLS         Ankle pump/ABC HEP        Hip abd HEP                                    Ther Ex         Bike         Leg Press         Alternating gastroc/soleus stretch slant board         Side Stepping         Heel slide HEP X10   HEP                                  Ther Activity         Step Ups         Step Downs         LSU         Squatting         Stair negotiation         Outcome Measures                                    Gait Training         Walker  50feet       SPC                           Modalities

## 2024-03-18 ENCOUNTER — TELEPHONE (OUTPATIENT)
Age: 69
End: 2024-03-18

## 2024-03-18 NOTE — TELEPHONE ENCOUNTER
Please let her know that she can start showering with the Mepilex in place.  If the Mepilex starts falling off can just remove it but it should stay on with the showering.  After 7 days can simply take off the Mepilex and continue to shower.  She should not scrub the incision for 3 weeks postoperatively.  Water should just be allowed to run over it

## 2024-03-18 NOTE — TELEPHONE ENCOUNTER
Please let her know that she can start showering with the Mepilex in place.  If the Mepilex starts falling off can just remove it but it should stay on with the showering.  After 7 days can simply take off the Mepilex and continue to shower.  She should not scrub the incision for 3 weeks postoperatively.  Water should just be allowed to run over it.

## 2024-03-18 NOTE — TELEPHONE ENCOUNTER
Pt called in w/ showering questions.     Reviewed AVS instructions: Keep the dressing clean and dry. Light drainage may occur the first 2 days postop.  Mepilex dressing with SAMI stockings over top for 7 days. You can then remove the Mepilex/bandage leg  dressing but continue with the SAMI stockings until seen in the office 2 weeks postoperatively.    AVS does not note when pt can shower and she would like to confirm. Will route.

## 2024-03-18 NOTE — TELEPHONE ENCOUNTER
Caller: Patient    Doctor/Office: Alena    Call regarding :  Questions regarding showering following 3/12 Total L knee sx.     Call was transferred to: Nurse Navigator

## 2024-03-19 ENCOUNTER — OFFICE VISIT (OUTPATIENT)
Dept: PHYSICAL THERAPY | Facility: OTHER | Age: 69
End: 2024-03-19
Payer: MEDICARE

## 2024-03-19 DIAGNOSIS — Z96.652 S/P TOTAL KNEE ARTHROPLASTY, LEFT: ICD-10-CM

## 2024-03-19 DIAGNOSIS — G89.29 CHRONIC PAIN OF LEFT KNEE: ICD-10-CM

## 2024-03-19 DIAGNOSIS — M25.562 CHRONIC PAIN OF LEFT KNEE: ICD-10-CM

## 2024-03-19 DIAGNOSIS — M17.12 PRIMARY OSTEOARTHRITIS OF LEFT KNEE: Primary | ICD-10-CM

## 2024-03-19 PROCEDURE — 97112 NEUROMUSCULAR REEDUCATION: CPT

## 2024-03-19 PROCEDURE — 97110 THERAPEUTIC EXERCISES: CPT

## 2024-03-19 NOTE — PROGRESS NOTES
Daily Note     Today's date: 3/19/2024  Patient name: Thao Ford  : 1955  MRN: [id number]  Referring provider: Akhil Carvajal, *  Dx:   Encounter Diagnosis     ICD-10-CM    1. Primary osteoarthritis of left knee  M17.12       2. S/P total knee arthroplasty, left  Z96.652       3. Chronic pain of left knee  M25.562     G89.29           Start Time: 1145  Stop Time: 1236  Total time in clinic (min): 51 minutes  1 on 1 time 28 minutes    Patient treated by Clarence Glass SPT, supervised by and discussed with Lio Augustin PT, DPT.     Subjective: Patient reports doing well and feeling better than her first visit. She continues to report moderate pain and requiring to take her oxicodon.     Objective: See treatment diary below      Assessment: Patient tolerated treatment well. She continues to demonstrate good progress s/p L knee arthroplasty and tolerated 4' on the recumbent bike to work on her knee ROM. She was also able to demonstrate good quad control to perform SAQ , although it was difficult to hold for > 3 sec. Patient demonstrated fatigue post treatment, exhibited good technique with therapeutic exercises, and would benefit from continued PT      Plan: Continue per plan of care.      Precautions: Hx of cancer. LBP. Hx bunionectomy, L knee arthroplasty sx on 3/12/24    Post-Op HEP Handout provided with marco antonio.    POC expires Unit limit Auth Expiration date PT/OT + Visit Limit?   24 N/A 4/15/24 BOMN                           Visit/Unit Tracking  AUTH Status:  Date 1/16 3/15 3/19            [id number] Used 1 1 1             Remaining  9 8 7              Visit 1 IE pre-op 2 RE post-op 3      Manuals 1/16/24 3/15/24 3/19/24      Knee PROM  HB/GJL HB  2-45      Patellar mobs   HB      Soft Tissue Deformation                           Assessment         Neuro Re-Ed         Quad Set HEP  2x10      Glute Set HEP        SAQ   2x10 + strap  1x10 no strap     Half foam roll      LAQ HEP       "  Glute Bridge         Clamshell HEP        SLS         Ankle pump/ABC HEP        Hip abd HEP        Weight shifting    2x10                        Ther Ex         Bike   Recumbent bike 4' ROM post      Leg Press         Alternating gastroc/soleus stretch slant board   Supine gastroc stretch  5x30\"       Side Stepping         Heel slide HEP X10   HEP 10x10\"                                  Ther Activity         Step Ups         Step Downs         LSU         Squatting         Stair negotiation         Outcome Measures                                    Gait Training         Walker  50feet       SPC                           Modalities                                       "

## 2024-03-25 ENCOUNTER — OFFICE VISIT (OUTPATIENT)
Dept: PHYSICAL THERAPY | Facility: OTHER | Age: 69
End: 2024-03-25
Payer: MEDICARE

## 2024-03-25 DIAGNOSIS — G89.29 CHRONIC PAIN OF LEFT KNEE: ICD-10-CM

## 2024-03-25 DIAGNOSIS — M17.12 PRIMARY OSTEOARTHRITIS OF LEFT KNEE: Primary | ICD-10-CM

## 2024-03-25 DIAGNOSIS — M25.562 CHRONIC PAIN OF LEFT KNEE: ICD-10-CM

## 2024-03-25 DIAGNOSIS — Z96.652 S/P TOTAL KNEE ARTHROPLASTY, LEFT: ICD-10-CM

## 2024-03-25 PROCEDURE — 97140 MANUAL THERAPY 1/> REGIONS: CPT

## 2024-03-25 PROCEDURE — 97112 NEUROMUSCULAR REEDUCATION: CPT

## 2024-03-25 PROCEDURE — 97110 THERAPEUTIC EXERCISES: CPT

## 2024-03-25 NOTE — PROGRESS NOTES
Daily Note     Today's date: 3/25/2024  Patient name: Thao Ford  : 1955  MRN: [id number]  Referring provider: Akhil Carvajal, *  Dx:   Encounter Diagnosis     ICD-10-CM    1. Primary osteoarthritis of left knee  M17.12       2. S/P total knee arthroplasty, left  Z96.652       3. Chronic pain of left knee  M25.562     G89.29           Start Time: 1048  Stop Time: 1138  Total time in clinic (min): 50 minutes    Subjective: Patient arrives ambulating with walker and states 5/10 pain pre-tx. Patient states she was able to negotiate 5 steps yesterday with minimal difficulty.      Objective: See treatment diary below      Assessment: Patient tolerated treatment session well. Patient is officially 2 weeks s/p TKR. Treatment session consisted of gentle stretching and strengthening as outlined below. Discomfort noted at end range knee flexion and slight increase of pain with OP into knee ext however, abolished once released. Improved quad activation/recruitment displayed while completing SAQ as strap was not needed. Able to progress to standing interventions which included hamstring curls, abd, ext. Ended session on stationary bike for ROM which patient tolerated well. Reduced pain symptoms reported post session and improved knee flexibility exhibited. Patient would benefit from continued stretching/strengthening to restore functional deficits.       Plan: Continue per POC. Increase reps/resistance as tolerated.      Precautions: Hx of cancer. LBP. Hx bunionectomy, L knee arthroplasty sx on 3/12/24    Post-Op HEP Handout provided with marco antonio.    POC expires Unit limit Auth Expiration date PT/OT + Visit Limit?   24 N/A 4/15/24 BOMN                           Visit/Unit Tracking  AUTH Status:  Date 1/16 3/15 3/19 3/25           [id number] Used 1 1 1 1            Remaining  9 8 7 6             Visit 1 IE pre-op 2 RE post-op 3 4     Manuals 1/16/24 3/15/24 3/19/24 3/25/24     Knee PROM  HB/GJL  "HB  2-45 MS     Patellar mobs   HB MS glides     Soft Tissue Deformation                           Assessment         Neuro Re-Ed         Quad Set HEP  2x10 5\" 2x10     Glute Set HEP        SAQ   2x10 + strap  1x10 no strap     Half foam roll 2x10 half foam roll     LAQ HEP   x10     Glute Bridge         Clamshell HEP        SLS         Ankle pump/ABC HEP        Hip abd HEP        Weight shifting    2x10 2x10     Standing hip abd/ext    X10 ea     Standing hamstring curls    x10     Ther Ex         Bike   Recumbent bike 4' ROM post Recumbent bike 5' rocking     Leg Press         Alternating gastroc/soleus stretch slant board   Supine gastroc stretch  5x30\"  Supine gastroc stretch  5x30\"      Side Stepping         Heel slide HEP X10   HEP 10x10\"  10x10\"                                 Ther Activity         Step Ups         Step Downs         LSU         Squatting         Stair negotiation         Outcome Measures                                    Gait Training         Walker  50feet       SPC                           Modalities                                         "

## 2024-03-27 ENCOUNTER — OFFICE VISIT (OUTPATIENT)
Dept: PHYSICAL THERAPY | Facility: OTHER | Age: 69
End: 2024-03-27
Payer: MEDICARE

## 2024-03-27 ENCOUNTER — APPOINTMENT (OUTPATIENT)
Dept: RADIOLOGY | Facility: OTHER | Age: 69
End: 2024-03-27
Payer: MEDICARE

## 2024-03-27 ENCOUNTER — OFFICE VISIT (OUTPATIENT)
Dept: OBGYN CLINIC | Facility: OTHER | Age: 69
End: 2024-03-27

## 2024-03-27 VITALS
SYSTOLIC BLOOD PRESSURE: 100 MMHG | HEIGHT: 64 IN | BODY MASS INDEX: 27.83 KG/M2 | HEART RATE: 88 BPM | WEIGHT: 163 LBS | DIASTOLIC BLOOD PRESSURE: 68 MMHG

## 2024-03-27 DIAGNOSIS — M25.562 CHRONIC PAIN OF LEFT KNEE: ICD-10-CM

## 2024-03-27 DIAGNOSIS — Z96.652 S/P TOTAL KNEE ARTHROPLASTY, LEFT: ICD-10-CM

## 2024-03-27 DIAGNOSIS — M17.12 PRIMARY OSTEOARTHRITIS OF LEFT KNEE: Primary | ICD-10-CM

## 2024-03-27 DIAGNOSIS — Z96.652 STATUS POST TOTAL KNEE REPLACEMENT, LEFT: Primary | ICD-10-CM

## 2024-03-27 DIAGNOSIS — G89.29 CHRONIC PAIN OF LEFT KNEE: ICD-10-CM

## 2024-03-27 DIAGNOSIS — Z96.652 STATUS POST TOTAL KNEE REPLACEMENT, LEFT: ICD-10-CM

## 2024-03-27 PROCEDURE — 73562 X-RAY EXAM OF KNEE 3: CPT

## 2024-03-27 PROCEDURE — 97140 MANUAL THERAPY 1/> REGIONS: CPT

## 2024-03-27 PROCEDURE — 99024 POSTOP FOLLOW-UP VISIT: CPT | Performed by: PHYSICIAN ASSISTANT

## 2024-03-27 PROCEDURE — 97110 THERAPEUTIC EXERCISES: CPT

## 2024-03-27 RX ORDER — AMOXICILLIN 500 MG/1
1000 CAPSULE ORAL
Qty: 8 CAPSULE | Refills: 2 | Status: SHIPPED | OUTPATIENT
Start: 2024-03-27 | End: 2024-03-28

## 2024-03-27 NOTE — PROGRESS NOTES
"Orthopaedic Surgery - Office Note  Thao Ford (68 y.o. female)   : 1955   MRN: [id number]  Encounter Date: 3/27/2024    Chief Complaint   Patient presents with    Left Knee - Post-op       Assessment / Plan  Status post left total knee arthroplasty with robotic assistance on 3/12/2024    Continue outpatient physical therapy.  Ice and analgesics as needed for pain.  I did review the patient's x-rays from today with her at today's appointment.  Did discuss the need for dental antibiotic prophylaxis going forward and I did prescribe her amoxicillin for an upcoming dental appointment.  Continue with aspirin for anticoagulation.  Return in about 6 weeks (around 2024) for follow up with Dr. Carvajal.    History of Present Illness  Thao Ford is a 68 y.o. female who presents for follow-up status post left total knee arthroplasty with robotic assistance on 3/12/2024.  Patient is progressing well at this time.  She states she did have a lot of pain for the first 2 days after surgery when the nerve block wore off the night of surgery.  She was taking oxycodone regularly at that time but has progressed to where she only takes it at night if needed.  Has been icing frequently and taking Tylenol.  She has been ambulating with a walker but feels that she is possibly at the point where she is ready to get rid of it.  Did start outpatient physical therapy and feels is going well.  She denies any issues with the incision or any distal numbness or tingling at this time.    Review of Systems  Pertinent items are noted in HPI.  All other systems were reviewed and are negative.    Physical Exam  /68   Pulse 88   Ht 5' 4\" (1.626 m)   Wt 73.9 kg (163 lb)   BMI 27.98 kg/m²   Cons: Appears well.  No apparent distress.  Psych: Alert. Oriented x3.  Mood and affect normal.  Eyes: PERRLA, EOMI  Resp: Normal effort.  No audible wheezing or stridor.  CV: Palpable pulse.  No discernable arrhythmia.  No LE " edema.  Lymph:  No palpable cervical, axillary, or inguinal lymphadenopathy.  Skin: Warm.  No palpable masses.  No visible lesions.  Neuro: Normal muscle tone.  Normal and symmetric DTR's.     Left knee Exam  Alignment:  Normal knee alignment.  Inspection:   Incision is healing well at this time, glue is intact no erythema or drainage noted.  Palpation:   Mild tenderness at incisional area.  ROM:  Knee Extension 0 degrees. Knee Flexion 95 degrees.  Strength:  Able to actively extend knee against gravity.  Stability:  Not tested.  Tests:  No pertinent positive or negative tests.  Patella:  Not tested.  Neurovascular:  Sensation intact in DP/SP/Montoya/Sa/T nerve distributions. Sensation intact in all digital nerve distributions.  Toes warm and perfused.  Gait:  Steady with walker.     Studies Reviewed  I have personally reviewed pertinent films in PACS.  XR of left knee - from 3/27/2024 shows prosthesis in good position with no signs of loosening.  No fracture or dislocation.    Procedures  No procedures today.    Medical, Surgical, Family, and Social History  The patient's medical history, family history, and social history, were reviewed and updated as appropriate.    Past Medical History:   Diagnosis Date    CML (chronic myelocytic leukemia) (HCC) 2000    Disease of thyroid gland     GERD (gastroesophageal reflux disease)     Herniated lumbar intervertebral disc     L5-S1    History of transfusion     Hypertension     Septicemia (HCC)     during admission for stem cell transplant       Past Surgical History:   Procedure Laterality Date    BUNIONECTOMY Left      SECTION      COLONOSCOPY      LIMBAL STEM CELL TRANSPLANT      ME ARTHRP KNE CONDYLE&PLATU MEDIAL&LAT COMPARTMENTS Left 3/12/2024    Procedure: ARTHROPLASTY KNEE TOTAL W ROBOT (same day dc);  Surgeon: Akhil Carvajal MD;  Location: [facility];  Service: Orthopedics       History reviewed. No pertinent family history.    Social History      Occupational History    Not on file   Tobacco Use    Smoking status: Never    Smokeless tobacco: Never   Vaping Use    Vaping status: Never Used   Substance and Sexual Activity    Alcohol use: Yes     Comment: rarely    Drug use: Never    Sexual activity: Not Currently       Allergies   Allergen Reactions    Gluten Meal - Food Allergy Diarrhea    Bactrim [Sulfamethoxazole-Trimethoprim] Other (See Comments)     Allergy from 23 years ago, doesn't remember reaction    Doxycycline Facial Swelling    Allopurinol Rash    Sulfites - Food Allergy Rash         Current Outpatient Medications:     acetaminophen (TYLENOL) 500 mg tablet, Take 500 mg by mouth daily at bedtime as needed for mild pain, Disp: , Rfl:     amoxicillin (AMOXIL) 500 mg capsule, Take 2 capsules (1,000 mg total) by mouth 60 minutes pre-procedure for 1 day, Disp: 8 capsule, Rfl: 2    ascorbic Acid (VITAMIN C) 500 MG CPCR, Take 1 capsule (500 mg total) by mouth daily, Disp: 60 capsule, Rfl: 0    aspirin (ECOTRIN LOW STRENGTH) 81 mg EC tablet, Take 1 tablet (81 mg total) by mouth 2 (two) times a day, Disp: 60 tablet, Rfl: 0    cholecalciferol (VITAMIN D3) 1,000 units tablet, Take 2,000 Units by mouth daily, Disp: , Rfl:     folic acid (KP Folic Acid) 1 mg tablet, Take 1 tablet (1 mg total) by mouth daily, Disp: 30 tablet, Rfl: 0    hydrochlorothiazide (HYDRODIURIL) 12.5 mg tablet, Take 12.5 mg by mouth daily, Disp: , Rfl:     levothyroxine 100 mcg tablet, Take 100 mcg by mouth daily, Disp: , Rfl:     Multiple Vitamin (multivitamin) tablet, Take 1 tablet by mouth daily, Disp: 30 tablet, Rfl: 0    Ozempic, 1 MG/DOSE, 4 MG/3ML injection pen, INJECT 1MG UNDER THE SKIN EVERY WEEK, Disp: , Rfl:     TURMERIC PO, Take 1 capsule by mouth daily, Disp: , Rfl:     valsartan (DIOVAN) 80 mg tablet, Take 80 mg by mouth daily, Disp: , Rfl:     ferrous sulfate 324 (65 Fe) mg, Take 1 tablet (324 mg total) by mouth 2 (two) times a day before meals (Patient not taking:  Reported on 3/27/2024), Disp: 60 tablet, Rfl: 0    Ibuprofen-diphenhydrAMINE Cit 200-38 MG TABS, Take by mouth daily at bedtime as needed (Patient not taking: Reported on 2/28/2024), Disp: , Rfl:     ondansetron (ZOFRAN-ODT) 4 mg disintegrating tablet, Take 1 tablet (4 mg total) by mouth every 8 (eight) hours as needed for nausea or vomiting, Disp: 15 tablet, Rfl: 0    semaglutide, 0.25 or 0.5 mg/dose, (Ozempic, 0.25 or 0.5 MG/DOSE,) 2 mg/1.5 mL injection pen, Inject 1 mg under the skin, Disp: , Rfl:       Armani Rocha PA-C    Scribe Attestation      I,:   am acting as a scribe while in the presence of the attending physician.:       I,:   personally performed the services described in this documentation    as scribed in my presence.:

## 2024-03-27 NOTE — PROGRESS NOTES
Daily Note     Today's date: 3/27/2024  Patient name: Thao Ford  : 1955  MRN: [id number]  Referring provider: Akhil Carvajal, *  Dx:   Encounter Diagnosis     ICD-10-CM    1. Primary osteoarthritis of left knee  M17.12       2. S/P total knee arthroplasty, left  Z96.652       3. Chronic pain of left knee  M25.562     G89.29           Start Time: 1530  Stop Time: 1615  Total time in clinic (min): 45 minutes  Billed 23 minutes all else IEP    Subjective: Patient arrives ambulating with walker and states her follow-up with her surgeon went well and is pleased with progress thus far. Patient reports she has been happy with care at this [facility] location, however would like to transfer to clinic closer to her home.       Objective: See treatment diary below    L Knee PROM:  Extension: -1 deg  Flexion: 105 deg    Assessment: Patient tolerated treatment session well this visit with focus on mobility, functional strengthening and balance/proprioception. Patient to transfer to [facility] location closer to home - but will continue with her HEP. Patient would benefit from continued skilled physical therapy to restore functional deficit and return to PLOF or better.      Plan: Continue per POC. Increase reps/resistance as tolerated.      Precautions: Hx of cancer. LBP. Hx bunionectomy, L knee arthroplasty sx on 3/12/24    Post-Op HEP Handout provided with marco antonio.    POC expires Unit limit Auth Expiration date PT/OT + Visit Limit?   24 N/A 4/15/24 BOMN                           Visit/Unit Tracking  AUTH Status:  Date 1/16 3/15 3/19 3/25 3/27         [id number] Used 1 1 1 1 1          Remaining  9 8 7 6 5           Visit 1 IE pre-op 2 RE post-op 3 4 5    Manuals 1/16/24 3/15/24 3/19/24 3/25/24 3/27/24    Knee PROM  HB/GJL HB  2-45 MS GJL    Patellar mobs   HB MS glides GJL    Soft Tissue Deformation     GJL gentle surrounding scar                      Assessment    "      Neuro Re-Ed         Quad Set HEP  2x10 5\" 2x10     Glute Set HEP        SAQ   2x10 + strap  1x10 no strap     Half foam roll 2x10 half foam roll     LAQ HEP   x10     Glute Bridge         Clamshell HEP        SLS         Ankle pump/ABC HEP        Hip abd HEP        Weight shifting    2x10 2x10 Weight shift airex 30x    Standing hip abd/ext    X10 ea     Standing hamstring curls    x10     Biodex     LOS 3 rounds min A railing             Ther Ex         Bike   Recumbent bike 4' ROM post Recumbent bike 5' rocking 6' rocking ROM/bloodflow    Leg Press         Alternating gastroc/soleus stretch slant board   Supine gastroc stretch  5x30\"  Supine gastroc stretch  5x30\"  Supine gastroc 20x5\"    Side Stepping         Heel slide HEP X10   HEP 10x10\"  10x10\"  20x10\"    Step lunge flexion     20x5\"    Seated knee flexion     10x5\"             Ther Activity         Step Ups     Airex 2x8 min A     Step Downs         LSU         Squatting         Stair negotiation         Outcome Measures         STS     throughout                      Gait Training         Walker  50feet       SPC     50 ft with education - SPC R UE, step through                      Modalities                                         "

## 2024-04-02 ENCOUNTER — EVALUATION (OUTPATIENT)
Dept: PHYSICAL THERAPY | Age: 69
End: 2024-04-02
Payer: MEDICARE

## 2024-04-02 DIAGNOSIS — Z96.652 S/P TOTAL KNEE ARTHROPLASTY, LEFT: Primary | ICD-10-CM

## 2024-04-02 PROCEDURE — 97110 THERAPEUTIC EXERCISES: CPT | Performed by: PHYSICAL THERAPIST

## 2024-04-02 PROCEDURE — 97164 PT RE-EVAL EST PLAN CARE: CPT | Performed by: PHYSICAL THERAPIST

## 2024-04-02 NOTE — PROGRESS NOTES
"Daily Note     Today's date: 2024  Patient name: Thao Ford  : 1955  MRN: [id number]  Referring provider: Akhil Carvajal, *  Dx:   Encounter Diagnosis     ICD-10-CM    1. S/P total knee arthroplasty, left  Z96.652                      Subjective: Pt reports difficulty with descending stairs, uses SPC for ambulation outside home       Objective: See treatment diary below      L knee ROM: 0-111    LE strength  B hip abd/L quad:       Assessment: Tolerated treatment well. Patient demonstrated fatigue post treatment and would benefit from continued PT Pt would benefit from skilled PT to continue to improve function with ADLs          By discharge pt will:  -Be independent with Phase II of HEP  -Demonstrate full LE strength  -Demonstrate full LE ROM  -Report minimal difficulty with ADLs      Plan: Continue per plan of care.  Progress treatment as tolerated.       Precautions: Hx of cancer. LBP. Hx bunionectomy, L knee arthroplasty sx on 3/12/24    Post-Op HEP Handout provided with marco antonio.                                      Visit         Manuals                                                               Neuro Re-Ed                                                                                                                                       Ther Ex         Nustep 15'        Wall heel slides 5'        Step ups 4\" 3x5                 Slant board stretch                                                                        Ther Activity                                                                                          Gait Training                                             Modalities                                           "

## 2024-04-03 DIAGNOSIS — M17.12 PRIMARY OSTEOARTHRITIS OF LEFT KNEE: ICD-10-CM

## 2024-04-03 RX ORDER — ASPIRIN 81 MG/1
81 TABLET, COATED ORAL 2 TIMES DAILY
Qty: 180 TABLET | Refills: 1 | Status: SHIPPED | OUTPATIENT
Start: 2024-04-03

## 2024-04-04 ENCOUNTER — OFFICE VISIT (OUTPATIENT)
Dept: PHYSICAL THERAPY | Age: 69
End: 2024-04-04
Payer: MEDICARE

## 2024-04-04 DIAGNOSIS — Z96.652 S/P TOTAL KNEE ARTHROPLASTY, LEFT: Primary | ICD-10-CM

## 2024-04-04 PROCEDURE — 97110 THERAPEUTIC EXERCISES: CPT | Performed by: PHYSICAL THERAPIST

## 2024-04-04 PROCEDURE — 97140 MANUAL THERAPY 1/> REGIONS: CPT | Performed by: PHYSICAL THERAPIST

## 2024-04-04 NOTE — PROGRESS NOTES
"Daily Note     Today's date: 2024  Patient name: Thao Ford  : 1955  MRN: [id number]  Referring provider: Akhil Carvajal, *  Dx:   Encounter Diagnosis     ICD-10-CM    1. S/P total knee arthroplasty, left  Z96.652                      Subjective: Pt reports pain after last session       Objective: See treatment diary below      Assessment: Tolerated treatment well. Patient demonstrated fatigue post treatment and would benefit from continued PT. Pt will trial Nustep at gym       Plan: Continue per plan of care.  Progress treatment as tolerated.       Precautions: Hx of cancer. LBP. Hx bunionectomy, L knee arthroplasty sx on 3/12/24    Post-Op HEP Handout provided with marco antonio.                                      Visit        Manuals         PROM TRISHA RICO                                                    Neuro Re-Ed         SS/ Heel-->toe on foam  5x                                                                                                                             Ther Ex         Nustep 15' 15'       Wall heel slides 5' 5'       Step ups 4\" 3x5 4\" 4x5       Lat step ups  6\" 2x10       Slant board stretch                                                                        Ther Activity                                                                                          Gait Training                                             Modalities                                             "

## 2024-04-09 ENCOUNTER — OFFICE VISIT (OUTPATIENT)
Dept: PHYSICAL THERAPY | Age: 69
End: 2024-04-09
Payer: MEDICARE

## 2024-04-09 DIAGNOSIS — M25.562 CHRONIC PAIN OF LEFT KNEE: ICD-10-CM

## 2024-04-09 DIAGNOSIS — Z96.652 S/P TOTAL KNEE ARTHROPLASTY, LEFT: Primary | ICD-10-CM

## 2024-04-09 DIAGNOSIS — M17.12 PRIMARY OSTEOARTHRITIS OF LEFT KNEE: ICD-10-CM

## 2024-04-09 DIAGNOSIS — G89.29 CHRONIC PAIN OF LEFT KNEE: ICD-10-CM

## 2024-04-09 PROCEDURE — 97110 THERAPEUTIC EXERCISES: CPT | Performed by: SPECIALIST/TECHNOLOGIST

## 2024-04-09 PROCEDURE — 97140 MANUAL THERAPY 1/> REGIONS: CPT | Performed by: SPECIALIST/TECHNOLOGIST

## 2024-04-09 NOTE — PROGRESS NOTES
"Daily Note     Today's date: 2024  Patient name: Thao Ford  : 1955  MRN: [id number]  Referring provider: Akhil Carvajal, *  Dx:   Encounter Diagnosis     ICD-10-CM    1. S/P total knee arthroplasty, left  Z96.652       2. Primary osteoarthritis of left knee  M17.12       3. Chronic pain of left knee  M25.562     G89.29                      Subjective: Pt reports compliance with HEP ROM work, was able to clean a little at home which she attributes to improved mobility.     Objective: See treatment diary below    Assessment: Pt able to perform several therex progressions this visit while demonstrating good target muscle activation. Excellent ROM progressions, full TKE actively and passively. Flexion to ~116* today. Tolerated treatment well. Patient demonstrated fatigue post treatment, exhibited good technique with therapeutic exercises, and would benefit from continued PT    Plan: Continue per plan of care.  Progress treatment as tolerated.  Pt instructed she is able to perform bike and/or NuStep and SL Leg Press at gym independently.      Precautions: Hx of cancer. LBP. Hx bunionectomy, L knee arthroplasty sx on 3/12/24    Post-Op HEP Handout provided with marco antonio.                                      Visit       Manuals         PROM TRISHA R[place] KM                                                   Neuro Re-Ed         SS/ Heel-->toe on foam  5x        SLS Foam   10x10s HHA prn                                                                                                                  Ther Ex         Nustep 15' 15' 15'       Wall heel slides 5' 5'       Step ups 4\" 3x5 4\" 4x5 6\" 30x 1HHA march through      Lat step ups  6\" 2x10       Slant board stretch   3x1 min L3      HR   3x10      Sled Push/Pull   3x50ft 0#      HS Curl SL   10# 3x10      Hip ABd   25# 3x10      SL Leg Press   30# 3x10                        Ther Activity                                                         "                                  Gait Training                                             Modalities

## 2024-04-11 ENCOUNTER — OFFICE VISIT (OUTPATIENT)
Dept: PHYSICAL THERAPY | Age: 69
End: 2024-04-11
Payer: MEDICARE

## 2024-04-11 DIAGNOSIS — Z96.652 S/P TOTAL KNEE ARTHROPLASTY, LEFT: Primary | ICD-10-CM

## 2024-04-11 PROCEDURE — 97140 MANUAL THERAPY 1/> REGIONS: CPT | Performed by: PHYSICAL THERAPIST

## 2024-04-11 PROCEDURE — 97110 THERAPEUTIC EXERCISES: CPT | Performed by: PHYSICAL THERAPIST

## 2024-04-11 NOTE — PROGRESS NOTES
"Daily Note     Today's date: 2024  Patient name: Thao Ford  : 1955  MRN: [id number]  Referring provider: Akhil Carvajal, *  Dx:   Encounter Diagnosis     ICD-10-CM    1. S/P total knee arthroplasty, left  Z96.652                      Subjective: No major changes, fatigue after walking while shopping yesterday       Objective: See treatment diary below      Assessment: Tolerated treatment well. Patient demonstrated fatigue post treatment, would benefit from continued PT, and pt continues to progress with LE strengthening       Plan: Continue per plan of care.  Progress treatment as tolerated.       Precautions: Hx of cancer. LBP. Hx bunionectomy, L knee arthroplasty sx on 3/12/24    Post-Op HEP Handout provided with marco antonio.                                      Visit      Manuals         PROM [place] TRISHA KM RICO                                                  Neuro Re-Ed         SS/ Heel-->toe on foam  5x        SLS Foam   10x10s HHA prn                                                                                                                  Ther Ex         Nustep 15' 15' 15'  15'     Wall heel slides 5' 5'       Step ups 4\" 3x5 4\" 4x5 6\" 30x 1HHA march through      Lat step ups  6\" 2x10       Slant board stretch   3x1 min L3 3x1 min L3     HR   3x10 4x10     Sled Push/Pull   3x50ft 0# 5x50ft 0#     HS Curl SL   10# 3x10 10# 2x10 30# DL x10     Hip ABd   25# 3x10 25# 3x10     SL Leg Press   30# 3x10 30# 3x10                       Ther Activity                                                                                          Gait Training                                             Modalities                                                 "

## 2024-04-16 ENCOUNTER — OFFICE VISIT (OUTPATIENT)
Dept: PHYSICAL THERAPY | Age: 69
End: 2024-04-16
Payer: MEDICARE

## 2024-04-16 DIAGNOSIS — M25.562 CHRONIC PAIN OF LEFT KNEE: ICD-10-CM

## 2024-04-16 DIAGNOSIS — Z96.652 S/P TOTAL KNEE ARTHROPLASTY, LEFT: Primary | ICD-10-CM

## 2024-04-16 DIAGNOSIS — M17.12 PRIMARY OSTEOARTHRITIS OF LEFT KNEE: ICD-10-CM

## 2024-04-16 DIAGNOSIS — G89.29 CHRONIC PAIN OF LEFT KNEE: ICD-10-CM

## 2024-04-16 PROCEDURE — 97110 THERAPEUTIC EXERCISES: CPT | Performed by: SPECIALIST/TECHNOLOGIST

## 2024-04-16 PROCEDURE — 97140 MANUAL THERAPY 1/> REGIONS: CPT | Performed by: SPECIALIST/TECHNOLOGIST

## 2024-04-16 NOTE — PROGRESS NOTES
"Daily Note     Today's date: 2024  Patient name: Thao Ford  : 1955  MRN: [id number]  Referring provider: Akhil Carvajal, *  Dx:   Encounter Diagnosis     ICD-10-CM    1. S/P total knee arthroplasty, left  Z96.652       2. Primary osteoarthritis of left knee  M17.12       3. Chronic pain of left knee  M25.562     G89.29                      Subjective: Pt reports she was more active yesterday while taking a day trip, sore today as a result.     Objective: See treatment diary below    Assessment: Pt's ROM, strength and balance continue to progress translating well to improved ambulatory function. Tolerated treatment well. Patient demonstrated fatigue post treatment, exhibited good technique with therapeutic exercises, and would benefit from continued PT    Plan: Continue per plan of care.  Progress treatment as tolerated.       Precautions: Hx of cancer. LBP. Hx bunionectomy, L knee arthroplasty sx on 3/12/24    Post-Op HEP Handout provided with marco antonio.                                      Visit     Manuals         PROM [place] RICO KM RICO Ext 0*, Flx 122*                                                 Neuro Re-Ed         SS/ Heel-->toe on foam  5x        SLS Foam   10x10s HHA prn      Tandem w [employer]     5' firm    Hurdles FW 2ft, FW 1ft, SS     5x ea                                                                                              Ther Ex         BIKE 15' 15' 15'  15' 10' BIKE    Wall heel slides 5' 5'       Step ups 4\" 3x5 4\" 4x5 6\" 30x 1HHA march through  Lateral 6\" 30x, FW 8\" 1HHA 20x    Lat step ups  6\" 2x10       Slant board stretch   3x1 min L3 3x1 min L3 3x1 min L4    HR   3x10 4x10 3x10    LAQ     3x10 7.5#    Sled Push/Pull   3x50ft 0# 5x50ft 0#     HS Curl SL   10# 3x10 10# 2x10 30# DL x10 15# 3x10 SL    Hip ABd   25# 3x10 25# 3x10 30# 3x10    SL Leg Press   30# 3x10 30# 3x10 35# 3x10 SL                      Ther Activity                         "                                                                  Gait Training                                             Modalities

## 2024-04-18 ENCOUNTER — OFFICE VISIT (OUTPATIENT)
Dept: PHYSICAL THERAPY | Age: 69
End: 2024-04-18
Payer: MEDICARE

## 2024-04-18 DIAGNOSIS — G89.29 CHRONIC PAIN OF LEFT KNEE: ICD-10-CM

## 2024-04-18 DIAGNOSIS — M25.562 CHRONIC PAIN OF LEFT KNEE: ICD-10-CM

## 2024-04-18 DIAGNOSIS — M17.12 PRIMARY OSTEOARTHRITIS OF LEFT KNEE: ICD-10-CM

## 2024-04-18 DIAGNOSIS — Z96.652 S/P TOTAL KNEE ARTHROPLASTY, LEFT: Primary | ICD-10-CM

## 2024-04-18 PROCEDURE — 97112 NEUROMUSCULAR REEDUCATION: CPT | Performed by: SPECIALIST/TECHNOLOGIST

## 2024-04-18 PROCEDURE — 97110 THERAPEUTIC EXERCISES: CPT | Performed by: SPECIALIST/TECHNOLOGIST

## 2024-04-18 NOTE — PROGRESS NOTES
"Daily Note     Today's date: 2024  Patient name: Thao Ford  : 1955  MRN: [id number]  Referring provider: Akhil Carvajal, *  Dx:   Encounter Diagnosis     ICD-10-CM    1. S/P total knee arthroplasty, left  Z96.652       2. Primary osteoarthritis of left knee  M17.12       3. Chronic pain of left knee  M25.562     G89.29                      Subjective: Pt reports soreness from increased activity.     Objective: See treatment diary below    Assessment: Consistent strength gains, cueing needed for eccentric quad control. Balance is improving. Tolerated treatment well. Patient demonstrated fatigue post treatment, exhibited good technique with therapeutic exercises, and would benefit from continued PT    Plan: Continue per plan of care.  Progress treatment as tolerated.       Precautions: Hx of cancer. LBP. Hx bunionectomy, L knee arthroplasty sx on 3/12/24    Post-Op HEP Handout provided with marco antonio.                                      Visit    Manuals         PROM [place] RICO KM RICO Ext 0*, Flx 122*    Modified TT stretch      KM                                       Neuro Re-Ed         SS/ Heel-->toe on foam  5x        SLS Foam   10x10s HHA prn   5x20s   Tandem w [employer]     5' firm 2x firm, 2x foam   Hurdles FW 2ft, FW 1ft, SS     5x ea                                                                                              Ther Ex         BIKE 15' 15' 15'  15' 10' BIKE 15'   Wall heel slides 5' 5'       Step ups 4\" 3x5 4\" 4x5 6\" 30x 1HHA march through  Lateral 6\" 30x, FW 8\" 1HHA 20x FW 8\" 30x, Lateral 6\" 30x   Lat step ups  6\" 2x10       Slant board stretch   3x1 min L3 3x1 min L3 3x1 min L4 3x1 min L4   HR   3x10 4x10 3x10 3x10   LAQ     3x10 7.5# 4x10 10#   Sled Push/Pull   3x50ft 0# 5x50ft 0#  15# 5x50ft   HS Curl SL   10# 3x10 10# 2x10 30# DL x10 15# 3x10 SL 15# 3x10 SL   Hip ABd   25# 3x10 25# 3x10 30# 3x10 35# 3x10   SL Leg Press   30# 3x10 30# 3x10 " 35# 3x10 SL 40# 3x10 SL                     Ther Activity                                                                                          Gait Training                                             Modalities

## 2024-04-23 ENCOUNTER — OFFICE VISIT (OUTPATIENT)
Dept: PHYSICAL THERAPY | Age: 69
End: 2024-04-23
Payer: MEDICARE

## 2024-04-23 DIAGNOSIS — G89.29 CHRONIC PAIN OF LEFT KNEE: ICD-10-CM

## 2024-04-23 DIAGNOSIS — Z96.652 S/P TOTAL KNEE ARTHROPLASTY, LEFT: Primary | ICD-10-CM

## 2024-04-23 DIAGNOSIS — M25.562 CHRONIC PAIN OF LEFT KNEE: ICD-10-CM

## 2024-04-23 DIAGNOSIS — M17.12 PRIMARY OSTEOARTHRITIS OF LEFT KNEE: ICD-10-CM

## 2024-04-23 PROCEDURE — 97112 NEUROMUSCULAR REEDUCATION: CPT | Performed by: SPECIALIST/TECHNOLOGIST

## 2024-04-23 PROCEDURE — 97110 THERAPEUTIC EXERCISES: CPT | Performed by: SPECIALIST/TECHNOLOGIST

## 2024-04-23 NOTE — PROGRESS NOTES
"Daily Note     Today's date: 2024  Patient name: Thao Ford  : 1955  MRN: [id number]  Referring provider: Akhil Carvajal, *  Dx:   Encounter Diagnosis     ICD-10-CM    1. S/P total knee arthroplasty, left  Z96.652       2. Primary osteoarthritis of left knee  M17.12       3. Chronic pain of left knee  M25.562     G89.29                      Subjective: Pt reports tightness/soreness after prolonged periods of inactivity.       Objective: See treatment diary below      Assessment: Pt continues to progress ROM, strength and balance which is contributing to improved overall ambulatory function and activity tolerance.  Tolerated treatment well. Patient demonstrated fatigue post treatment, exhibited good technique with therapeutic exercises, and would benefit from continued PT      Plan: Continue per plan of care.  Progress treatment as tolerated.       Precautions: Hx of cancer. LBP. Hx bunionectomy, L knee arthroplasty sx on 3/12/24    Post-Op HEP Handout provided with marco antonio.                                      Visit     Manuals        PROM [place] Ext 0*, Flx 122*  * flx    Modified TT stretch   KM                                     Neuro Re-Ed        SS/ Heel-->toe on foam         SLS Foam   5x20s     Tandem w [employer]  5' firm 2x firm, 2x foam     Hurdles FW 2ft, FW 1ft, SS  5x ea  Foam + hurdles 5x ea                                                                                    Ther Ex        BIKE 15' 10' BIKE 15' 15'     Wall heel slides        Step ups  Lateral 6\" 30x, FW 8\" 1HHA 20x FW 8\" 30x, Lateral 6\" 30x Bosu 30x, 6\" lateral 30x    Lat step ups        Slant board stretch 3x1 min L3 3x1 min L4 3x1 min L4 3x1 min L4    HR 4x10 3x10 3x10     LAQ  3x10 7.5# 4x10 10# 5x10 10#    Sled Push/Pull 5x50ft 0#  15# 5x50ft 15# 5x50ft    HS Curl SL 10# 2x10 30# DL x10 15# 3x10 SL 15# 3x10 SL 15# 3x10 SL    Hip ABd 25# 3x10 30# 3x10 35# 3x10 35# 3x10    SL Leg " Press 30# 3x10 35# 3x10 SL 40# 3x10 SL Deferred LBP                    Ther Activity                                                                                Gait Training                                        Modalities

## 2024-04-25 ENCOUNTER — OFFICE VISIT (OUTPATIENT)
Dept: PHYSICAL THERAPY | Age: 69
End: 2024-04-25
Payer: MEDICARE

## 2024-04-25 DIAGNOSIS — Z96.652 S/P TOTAL KNEE ARTHROPLASTY, LEFT: Primary | ICD-10-CM

## 2024-04-25 PROCEDURE — 97110 THERAPEUTIC EXERCISES: CPT | Performed by: PHYSICAL THERAPIST

## 2024-04-25 PROCEDURE — 97140 MANUAL THERAPY 1/> REGIONS: CPT | Performed by: PHYSICAL THERAPIST

## 2024-04-25 NOTE — PROGRESS NOTES
"Daily Note     Today's date: 2024  Patient name: Thao Ford  : 1955  MRN: [id number]  Referring provider: Akhil Carvajal, *  Dx:   Encounter Diagnosis     ICD-10-CM    1. S/P total knee arthroplasty, left  Z96.652                      Subjective: no complaints       Objective: See treatment diary below      Assessment: Tolerated treatment well. Patient demonstrated fatigue post treatment and would benefit from continued PT      Plan: Continue per plan of care.  Progress treatment as tolerated.       Precautions: Hx of cancer. LBP. Hx bunionectomy, L knee arthroplasty sx on 3/12/24    Post-Op HEP Handout provided with marco antonio.                                      Visit    Manuals        PROM RICO Ext 0*, Flx 122*  * flx RICO   Modified TT stretch   KM                                     Neuro Re-Ed        SS/ Heel-->toe on foam         SLS Foam   5x20s     Tandem w [employer]  5' firm 2x firm, 2x foam     Hurdles FW 2ft, FW 1ft, SS  5x ea  Foam + hurdles 5x ea Foam + hurdles 5x ea                                                                                   Ther Ex        BIKE 15' 10' BIKE 15' 15'  15'   Wall heel slides        Step ups  Lateral 6\" 30x, FW 8\" 1HHA 20x FW 8\" 30x, Lateral 6\" 30x Bosu 30x, 6\" lateral 30x    Lat step ups        Slant board stretch 3x1 min L3 3x1 min L4 3x1 min L4 3x1 min L4 3x1 min L4   HR 4x10 3x10 3x10     LAQ  3x10 7.5# 4x10 10# 5x10 10# 5x10 10#   Sled Push/Pull 5x50ft 0#  15# 5x50ft 15# 5x50ft 15# 5x50ft   HS Curl SL 10# 2x10 30# DL x10 15# 3x10 SL 15# 3x10 SL 15# 3x10 SL 15# 3x10 SL   Hip ABd 25# 3x10 30# 3x10 35# 3x10 35# 3x10 35# 3x10   SL Leg Press 30# 3x10 35# 3x10 SL 40# 3x10 SL Deferred LBP                    Ther Activity                                                                                Gait Training                                        Modalities                                                      "

## 2024-04-26 ENCOUNTER — TELEPHONE (OUTPATIENT)
Age: 69
End: 2024-04-26

## 2024-04-26 ENCOUNTER — HOSPITAL ENCOUNTER (EMERGENCY)
Facility: HOSPITAL | Age: 69
Discharge: HOME | End: 2024-04-26
Attending: EMERGENCY MEDICINE
Payer: MEDICARE

## 2024-04-26 ENCOUNTER — APPOINTMENT (EMERGENCY)
Dept: RADIOLOGY | Facility: HOSPITAL | Age: 69
End: 2024-04-26
Payer: MEDICARE

## 2024-04-26 VITALS
TEMPERATURE: 97.6 F | RESPIRATION RATE: 22 BRPM | DIASTOLIC BLOOD PRESSURE: 89 MMHG | HEART RATE: 99 BPM | SYSTOLIC BLOOD PRESSURE: 159 MMHG | OXYGEN SATURATION: 99 %

## 2024-04-26 DIAGNOSIS — S81.019A KNEE LACERATION: Primary | ICD-10-CM

## 2024-04-26 PROCEDURE — 99284 EMERGENCY DEPT VISIT MOD MDM: CPT | Performed by: EMERGENCY MEDICINE

## 2024-04-26 PROCEDURE — 12002 RPR S/N/AX/GEN/TRNK2.6-7.5CM: CPT | Performed by: EMERGENCY MEDICINE

## 2024-04-26 PROCEDURE — 73564 X-RAY EXAM KNEE 4 OR MORE: CPT

## 2024-04-26 PROCEDURE — 99283 EMERGENCY DEPT VISIT LOW MDM: CPT

## 2024-04-26 RX ORDER — LIDOCAINE HYDROCHLORIDE 10 MG/ML
10 INJECTION, SOLUTION EPIDURAL; INFILTRATION; INTRACAUDAL; PERINEURAL ONCE
Status: COMPLETED | OUTPATIENT
Start: 2024-04-26 | End: 2024-04-26

## 2024-04-26 RX ORDER — CEPHALEXIN 500 MG/1
500 CAPSULE ORAL EVERY 6 HOURS SCHEDULED
Qty: 20 CAPSULE | Refills: 0 | Status: SHIPPED | OUTPATIENT
Start: 2024-04-26 | End: 2024-05-01

## 2024-04-26 RX ORDER — ACETAMINOPHEN 325 MG/1
650 TABLET ORAL ONCE
Status: COMPLETED | OUTPATIENT
Start: 2024-04-26 | End: 2024-04-26

## 2024-04-26 RX ADMIN — LIDOCAINE HYDROCHLORIDE 10 ML: 10 INJECTION, SOLUTION EPIDURAL; INFILTRATION; INTRACAUDAL at 21:13

## 2024-04-26 RX ADMIN — ACETAMINOPHEN 650 MG: 325 TABLET, FILM COATED ORAL at 19:56

## 2024-04-26 NOTE — TELEPHONE ENCOUNTER
Caller: Ailyn Ford   1955  MRN [id number]    Doctor: Alena     Reason for call: Had TKR 3/12/2024, she just fell on her driveway and landed on her knees. The incision site completely ripped open and she is bleeding, Has pressure dressings on but wants to know which [facility] ER she should get to     Call back#: [phone]      Paged on call

## 2024-04-27 NOTE — ED ATTENDING ATTESTATION
4/26/2024  I, Hugo Burkett DO, saw and evaluated the patient. I have discussed the patient with the resident/non-physician practitioner and agree with the resident's/non-physician practitioner's findings, Plan of Care, and MDM as documented in the resident's/non-physician practitioner's note, except where noted. All available labs and Radiology studies were reviewed.  I was present for key portions of any procedure(s) performed by the resident/non-physician practitioner and I was immediately available to provide assistance.       At this point I agree with the current assessment done in the Emergency Department.  I have conducted an independent evaluation of this patient a history and physical is as follows:    Patient is a 68-year-old female status post total left knee arthroplasty March 12, 2024, hypertension, CML, accompanied by her .  Earlier this evening the patient slipped and fell in her driveway, she was wearing heavy pants, she fell forward landing on both of her knees, did not hit her head or pass out.  Afterwards she would get up and ambulate but when she took off the sweatpants noticed that she had wound dehiscence to her left lower knee incision.  She has no foreign body sensation, no numbness, no tingling, no weakness.  Prior to the fall she has been doing physical therapy well, feels like she is healing up appropriately, able to ambulate and do her bicycle activities.    General:  Patient is well-appearing  Head:  Atraumatic  Eyes:  Conjunctiva pink  ENT:  Mucous membranes are moist  Neck:  Supple  Cardiac:  S1-S2, without murmurs  Lungs:  Clear to auscultation bilaterally  Abdomen:  Soft, nontender, normal bowel sounds, no CVA tenderness, no tympany, no rigidity, no guarding  Extremities: Patient's anterior leg over the knee has a partial healing incision with scar formation, on the distal aspect of this incision is a 8 cm long area of wound dehiscence.  The wound dehiscence is 3 mm deep,  there is no active bleeding.  There is no visible bone, there is no disruption of the muscle tissue underneath.  She has normal sensation of the entire leg, can do a straight leg raise.  She has no significant pain with range of motion at the left knee, no ligament laxity, no instability, passive range of motion is normal.  DP and PT pulses are intact and symmetric bilaterally,  Neurologic:  Awake, fluent speech, normal comprehension. AAOx3.   Skin:  Pink warm and dry  Psychiatric:  Alert, pleasant, cooperative          ED Course     Left knee x-ray interpreted me shows no sign of fracture or periprosthetic fracture or hardware dislocation.  On reassessment there was no change in the above findings.  At this point patient has a fall, wound dehiscence but I do not believe she had violation of the knee capsule given the superficial nature of the wound and I do not believe that she requires a CT or a saline dye load test.  Plan is to discuss with orthopedics due to the fact that she is recently postoperative, and then anticipate closure of the wound.  Signed out to Dr. Clemente      MEDICAL DECISION MAKING CODING      Chronic conditions affecting care: As per HPI    COLLECTION AND INTERPRETATION OF DATA  Additional history obtained from:   I reviewed prior external notes, including March 12, 2024 operative note    I ordered each unique test  Tests reviewed personally by me:  Imaging: I independently reviewed the left knee xray as noted above.      Surgery  -I considered surgery may be necessary prior to completion of the work up but afterwards there is no indication for immediate surgery    Social Determinants of Health:  Presentation to ED outside of business hours or on night shift        Critical Care Time  Procedures

## 2024-04-27 NOTE — DISCHARGE INSTRUCTIONS
Follow-up with Dr. Carvajal's office in 10 to 14 days for a knee recheck and suture removal.    Return to the emergency department if you have increasing pain, fevers, redness, swelling at the laceration site.      Do not get your wound wet  Do not soak your wound in water. Do not go swimming until your healthcare provider says it is okay. Carefully wash the wound with soap and water. Gently pat the area dry or allow it to air dry.    Change your bandages  when they get wet, dirty, or after washing. Apply new, clean bandages as directed. Do not apply elastic bandages or tape too tight. Do not put powders or lotions over your incision.

## 2024-04-28 NOTE — ED PROVIDER NOTES
History  Chief Complaint   Patient presents with    Knee Injury     Pt tripped in driveway and reopened old incision on L knee after total knee replacement. -HS, was able to ambulate after fall.      68F pmh total left knee replacement 3/12 p/w reopening of surgical incision after a fall earlier this evening. Fall was mechanical, caught foot in depression, landed on knees, no other injuries, HS, LOC. Denies other symptoms.         Prior to Admission Medications   Prescriptions Last Dose Informant Patient Reported? Taking?   Aspirin Low Dose 81 MG EC tablet   No No   Sig: TAKE 1 TABLET BY MOUTH 2 TIMES A DAY.   Ibuprofen-diphenhydrAMINE Cit 200-38 MG TABS   Yes No   Sig: Take by mouth daily at bedtime as needed   Patient not taking: Reported on 2/28/2024   Multiple Vitamin (multivitamin) tablet   No No   Sig: Take 1 tablet by mouth daily   Ozempic, 1 MG/DOSE, 4 MG/3ML injection pen   Yes No   Sig: INJECT 1MG UNDER THE SKIN EVERY WEEK   TURMERIC PO   Yes No   Sig: Take 1 capsule by mouth daily   acetaminophen (TYLENOL) 500 mg tablet   Yes No   Sig: Take 500 mg by mouth daily at bedtime as needed for mild pain   ascorbic Acid (VITAMIN C) 500 MG CPCR   No No   Sig: Take 1 capsule (500 mg total) by mouth daily   cholecalciferol (VITAMIN D3) 1,000 units tablet   Yes No   Sig: Take 2,000 Units by mouth daily   ferrous sulfate 324 (65 Fe) mg   No No   Sig: Take 1 tablet (324 mg total) by mouth 2 (two) times a day before meals   Patient not taking: Reported on 3/27/2024   folic acid ( Folic Acid) 1 mg tablet   No No   Sig: Take 1 tablet (1 mg total) by mouth daily   hydrochlorothiazide (HYDRODIURIL) 12.5 mg tablet   Yes No   Sig: Take 12.5 mg by mouth daily   levothyroxine 100 mcg tablet   Yes No   Sig: Take 100 mcg by mouth daily   ondansetron (ZOFRAN-ODT) 4 mg disintegrating tablet   No No   Sig: Take 1 tablet (4 mg total) by mouth every 8 (eight) hours as needed for nausea or vomiting   semaglutide, 0.25 or 0.5 mg/dose,  (Ozempic, 0.25 or 0.5 MG/DOSE,) 2 mg/1.5 mL injection pen   Yes No   Sig: Inject 1 mg under the skin   valsartan (DIOVAN) 80 mg tablet   Yes No   Sig: Take 80 mg by mouth daily      Facility-Administered Medications: None       Past Medical History:   Diagnosis Date    CML (chronic myelocytic leukemia) (HCC) 2000    Disease of thyroid gland     GERD (gastroesophageal reflux disease)     Herniated lumbar intervertebral disc     L5-S1    History of transfusion     Hypertension     Septicemia (HCC)     during admission for stem cell transplant       Past Surgical History:   Procedure Laterality Date    BUNIONECTOMY Left      SECTION      COLONOSCOPY      LIMBAL STEM CELL TRANSPLANT      AL ARTHRP KNE CONDYLE&PLATU MEDIAL&LAT COMPARTMENTS Left 3/12/2024    Procedure: ARTHROPLASTY KNEE TOTAL W ROBOT (same day dc);  Surgeon: Akhil Carvajal MD;  Location: [facility] OR;  Service: Orthopedics       History reviewed. No pertinent family history.  I have reviewed and agree with the history as documented.    E-Cigarette/Vaping    E-Cigarette Use Never User      E-Cigarette/Vaping Substances    Nicotine No     THC No     CBD No     Flavoring No     Other No     Unknown No      Social History     Tobacco Use    Smoking status: Never    Smokeless tobacco: Never   Vaping Use    Vaping status: Never Used   Substance Use Topics    Alcohol use: Yes     Comment: rarely    Drug use: Never        Review of Systems   All other systems reviewed and are negative.      Physical Exam  ED Triage Vitals [24]   Temperature Pulse Respirations Blood Pressure SpO2   97.6 °F (36.4 °C) 99 22 159/89 99 %      Temp src Heart Rate Source Patient Position - Orthostatic VS BP Location FiO2 (%)   -- -- -- -- --      Pain Score       5             Orthostatic Vital Signs  Vitals:    24 1841   BP: 159/89   Pulse: 99       Physical Exam  Vitals and nursing note reviewed.   Constitutional:       General: She is not in  acute distress.     Appearance: She is well-developed.   HENT:      Head: Normocephalic and atraumatic.   Eyes:      Conjunctiva/sclera: Conjunctivae normal.   Cardiovascular:      Rate and Rhythm: Normal rate and regular rhythm.      Heart sounds: No murmur heard.  Pulmonary:      Effort: Pulmonary effort is normal. No respiratory distress.      Breath sounds: Normal breath sounds.   Abdominal:      Palpations: Abdomen is soft.      Tenderness: There is no abdominal tenderness.   Musculoskeletal:         General: No swelling.      Cervical back: Neck supple.   Skin:     General: Skin is warm and dry.      Capillary Refill: Capillary refill takes less than 2 seconds.      Comments: Bottom half of surgical incision of arthroplasty open, no involvement of muscle bodies or fascia   Neurological:      Mental Status: She is alert.   Psychiatric:         Mood and Affect: Mood normal.         ED Medications  Medications   acetaminophen (TYLENOL) tablet 650 mg (650 mg Oral Given 4/26/24 1956)   lidocaine (PF) (XYLOCAINE-MPF) 1 % injection 10 mL (10 mL Infiltration Given by Other 4/26/24 2113)       Diagnostic Studies  Results Reviewed       None                   XR knee 4+ vw left injury   Final Result by Simon Sanchez MD (04/27 1121)      No acute osseous abnormality.      I have personally reviewed this study including all images.  / R.J.F.            Resident: KAREN Soler I, the attending radiologist, have reviewed the images and agree with the final report above.      Workstation performed: [id number]               Procedures  Universal Protocol:  Consent: Verbal consent obtained.  Consent given by: patient  Patient identity confirmed: verbally with patient  Laceration repair    Date/Time: 4/28/2024 4:45 PM    Performed by: Dre Patel MD  Authorized by: Dre Patel MD  Body area: lower extremity  Location details: left knee  Laceration length: 6 cm  Foreign bodies: no foreign bodies  Tendon  involvement: none  Nerve involvement: none  Anesthesia: local infiltration    Anesthesia:  Local Anesthetic: lidocaine 1% without epinephrine    Wound Dehiscence:  Superficial Wound Dehiscence: simple closure      Procedure Details:  Irrigation solution: saline  Irrigation method: syringe  Amount of cleaning: extensive  Debridement: none  Degree of undermining: none  Skin closure: 3-0 Prolene  Number of sutures: 7  Approximation: close  Approximation difficulty: simple  Dressing: 4x4 sterile gauze            ED Course                                       Medical Decision Making  Laceration appears superficial w/o fascial or muscle involvement. Will close knee and send to f/u w/ outpatient ortho.       Patient tolerated closure w/o complications. Patient adamantly requesting abx for infection ppx, explained to patient lack of indication. Patient extremely concerned about risk of infection. Explained to patient relative lack of benefit vs risk of abx txt, to which the patient expressed understanding. While margin of benefit small, will prescribe short course of keflex as ppx.    Amount and/or Complexity of Data Reviewed  Radiology: ordered and independent interpretation performed.    Risk  OTC drugs.  Prescription drug management.          Disposition  Final diagnoses:   Knee laceration     Time reflects when diagnosis was documented in both MDM as applicable and the Disposition within this note       Time User Action Codes Description Comment    4/26/2024  9:40 PM Dre Patel Add [S81.019A] Knee laceration           ED Disposition       ED Disposition   Discharge    Condition   Stable    Date/Time   Fri Apr 26, 2024  9:40 PM    Comment   Thao Ford discharge to home/self care.                   Follow-up Information    None         Discharge Medication List as of 4/26/2024  9:42 PM        CONTINUE these medications which have NOT CHANGED    Details   acetaminophen (TYLENOL) 500 mg tablet Take 500 mg by  mouth daily at bedtime as needed for mild pain, Historical Med      ascorbic Acid (VITAMIN C) 500 MG CPCR Take 1 capsule (500 mg total) by mouth daily, Starting Wed 1/10/2024, Print      Aspirin Low Dose 81 MG EC tablet TAKE 1 TABLET BY MOUTH 2 TIMES A DAY., Starting Wed 4/3/2024, Normal      cholecalciferol (VITAMIN D3) 1,000 units tablet Take 2,000 Units by mouth daily, Historical Med      ferrous sulfate 324 (65 Fe) mg Take 1 tablet (324 mg total) by mouth 2 (two) times a day before meals, Starting Wed 1/10/2024, Print      folic acid (KP Folic Acid) 1 mg tablet Take 1 tablet (1 mg total) by mouth daily, Starting Wed 1/10/2024, Print      hydrochlorothiazide (HYDRODIURIL) 12.5 mg tablet Take 12.5 mg by mouth daily, Historical Med      Ibuprofen-diphenhydrAMINE Cit 200-38 MG TABS Take by mouth daily at bedtime as needed, Historical Med      levothyroxine 100 mcg tablet Take 100 mcg by mouth daily, Historical Med      Multiple Vitamin (multivitamin) tablet Take 1 tablet by mouth daily, Starting Wed 1/10/2024, Print      ondansetron (ZOFRAN-ODT) 4 mg disintegrating tablet Take 1 tablet (4 mg total) by mouth every 8 (eight) hours as needed for nausea or vomiting, Starting Tue 3/12/2024, Normal      Ozempic, 1 MG/DOSE, 4 MG/3ML injection pen INJECT 1MG UNDER THE SKIN EVERY WEEK, Historical Med      semaglutide, 0.25 or 0.5 mg/dose, (Ozempic, 0.25 or 0.5 MG/DOSE,) 2 mg/1.5 mL injection pen Inject 1 mg under the skin, Historical Med      TURMERIC PO Take 1 capsule by mouth daily, Historical Med      valsartan (DIOVAN) 80 mg tablet Take 80 mg by mouth daily, Historical Med           No discharge procedures on file.    PDMP Review       None             ED Provider  Attending physically available and evaluated Thao Ford. I managed the patient along with the ED Attending.    Electronically Signed by           Dre Patel MD  04/28/24 1687

## 2024-04-29 ENCOUNTER — OFFICE VISIT (OUTPATIENT)
Dept: OBGYN CLINIC | Facility: MEDICAL CENTER | Age: 69
End: 2024-04-29

## 2024-04-29 VITALS — WEIGHT: 163 LBS | HEIGHT: 64 IN | BODY MASS INDEX: 27.83 KG/M2

## 2024-04-29 DIAGNOSIS — Z96.652 STATUS POST TOTAL KNEE REPLACEMENT, LEFT: Primary | ICD-10-CM

## 2024-04-29 PROCEDURE — 99024 POSTOP FOLLOW-UP VISIT: CPT | Performed by: ORTHOPAEDIC SURGERY

## 2024-04-29 NOTE — TELEPHONE ENCOUNTER
Did reach out to the patient to discuss her fall.  She is overall doing well got some antibiotics at the ER and will send me a picture of her incision for further evaluation.

## 2024-04-29 NOTE — PROGRESS NOTES
"Orthopaedic Surgery - Office Note  Thao Ford (68 y.o. female)   : 1955   MRN: [id number]  Encounter Date: 2024    Chief Complaint   Patient presents with    Left Knee - Post-op       Assessment / Plan  Status post left total knee arthroplasty with robotic assistance on 3/12/2024  Status post fall 2024 on the anterior aspect of the left knee which was closed in the emergency room.    Continue outpatient physical therapy.  Ice and analgesics as needed for pain.  I did review the patient's x-rays from the emergency room with her at today's appointment.  Finish course of Keflex that she was provided from the emergency room.  Return for as scheduled May 8th, 2024. We will do a wound check at that time and possibly remove sutures if healed.     History of Present Illness  Thao Ford is a 68 y.o. female who presents for follow-up status post left total knee arthroplasty with robotic assistance on 3/12/2024.  Patient was doing very well until 2024 when she fell in her garage on concrete causing the distal aspect of the incision to open.  She feels that this was very superficial and did go to the emergency room at [place] where after evaluation they closed her incision with sutures in the emergency room.  She was given Keflex a 5-day course which she is currently taking.  She denies any distal numbness or tingling at this time.    Review of Systems  Pertinent items are noted in HPI.  All other systems were reviewed and are negative.    Physical Exam  Ht 5' 4\" (1.626 m)   Wt 73.9 kg (163 lb)   BMI 27.98 kg/m²   Cons: Appears well.  No apparent distress.  Psych: Alert. Oriented x3.  Mood and affect normal.  Eyes: PERRLA, EOMI  Resp: Normal effort.  No audible wheezing or stridor.  CV: Palpable pulse.  No discernable arrhythmia.  No LE edema.  Lymph:  No palpable cervical, axillary, or inguinal lymphadenopathy.  Skin: Warm.  No palpable masses.  No visible lesions.  Neuro: Normal muscle " tone.  Normal and symmetric DTR's.     Left knee Exam  Alignment:  Normal knee alignment.  Inspection:   Bottom half of the incision is closed with suture with scant serous drainage on the dressing and no notable erythema  Palpation:   Mild tenderness at repaired area of incision.  ROM:  Knee Extension 0 degrees. Knee Flexion not tested due to wound.  Strength:  Able to actively extend knee against gravity.  Stability:  Not tested.  Tests:  No pertinent positive or negative tests.  Patella:  Not tested.  Neurovascular:  Sensation intact in DP/SP/Montoya/Sa/T nerve distributions. Sensation intact in all digital nerve distributions.  Toes warm and perfused.  Gait:  Normal.     Studies Reviewed  I have personally reviewed pertinent films in PACS.  XR of left knee - from 2024 shows prosthesis in good position with no signs of loosening.  No fracture or dislocation.    Procedures  No procedures today.    Medical, Surgical, Family, and Social History  The patient's medical history, family history, and social history, were reviewed and updated as appropriate.    Past Medical History:   Diagnosis Date    CML (chronic myelocytic leukemia) (HCC) 2000    Disease of thyroid gland     GERD (gastroesophageal reflux disease)     Herniated lumbar intervertebral disc     L5-S1    History of transfusion     Hypertension     Septicemia (HCC)     during admission for stem cell transplant       Past Surgical History:   Procedure Laterality Date    BUNIONECTOMY Left      SECTION      COLONOSCOPY      LIMBAL STEM CELL TRANSPLANT      RI ARTHRP KNE CONDYLE&PLATU MEDIAL&LAT COMPARTMENTS Left 3/12/2024    Procedure: ARTHROPLASTY KNEE TOTAL W ROBOT (same day dc);  Surgeon: Akhil Carvajal MD;  Location[facility] OR;  Service: Orthopedics       History reviewed. No pertinent family history.    Social History     Occupational History    Not on file   Tobacco Use    Smoking status: Never    Smokeless tobacco: Never    Vaping Use    Vaping status: Never Used   Substance and Sexual Activity    Alcohol use: Yes     Comment: rarely    Drug use: Never    Sexual activity: Not Currently       Allergies   Allergen Reactions    Gluten Meal - Food Allergy Diarrhea    Bactrim [Sulfamethoxazole-Trimethoprim] Other (See Comments)     Allergy from 23 years ago, doesn't remember reaction    Doxycycline Facial Swelling    Allopurinol Rash    Sulfites - Food Allergy Rash         Current Outpatient Medications:     ascorbic Acid (VITAMIN C) 500 MG CPCR, Take 1 capsule (500 mg total) by mouth daily, Disp: 60 capsule, Rfl: 0    cephalexin (KEFLEX) 500 mg capsule, Take 1 capsule (500 mg total) by mouth every 6 (six) hours for 5 days, Disp: 20 capsule, Rfl: 0    cholecalciferol (VITAMIN D3) 1,000 units tablet, Take 2,000 Units by mouth daily, Disp: , Rfl:     hydrochlorothiazide (HYDRODIURIL) 12.5 mg tablet, Take 12.5 mg by mouth daily, Disp: , Rfl:     levothyroxine 100 mcg tablet, Take 100 mcg by mouth daily, Disp: , Rfl:     Multiple Vitamin (multivitamin) tablet, Take 1 tablet by mouth daily, Disp: 30 tablet, Rfl: 0    Ozempic, 1 MG/DOSE, 4 MG/3ML injection pen, INJECT 1MG UNDER THE SKIN EVERY WEEK, Disp: , Rfl:     TURMERIC PO, Take 1 capsule by mouth daily, Disp: , Rfl:     acetaminophen (TYLENOL) 500 mg tablet, Take 500 mg by mouth daily at bedtime as needed for mild pain, Disp: , Rfl:     Aspirin Low Dose 81 MG EC tablet, TAKE 1 TABLET BY MOUTH 2 TIMES A DAY., Disp: 180 tablet, Rfl: 1    ferrous sulfate 324 (65 Fe) mg, Take 1 tablet (324 mg total) by mouth 2 (two) times a day before meals (Patient not taking: Reported on 3/27/2024), Disp: 60 tablet, Rfl: 0    folic acid (KP Folic Acid) 1 mg tablet, Take 1 tablet (1 mg total) by mouth daily, Disp: 30 tablet, Rfl: 0    Ibuprofen-diphenhydrAMINE Cit 200-38 MG TABS, Take by mouth daily at bedtime as needed (Patient not taking: Reported on 2/28/2024), Disp: , Rfl:     ondansetron (ZOFRAN-ODT) 4  mg disintegrating tablet, Take 1 tablet (4 mg total) by mouth every 8 (eight) hours as needed for nausea or vomiting, Disp: 15 tablet, Rfl: 0    semaglutide, 0.25 or 0.5 mg/dose, (Ozempic, 0.25 or 0.5 MG/DOSE,) 2 mg/1.5 mL injection pen, Inject 1 mg under the skin, Disp: , Rfl:     valsartan (DIOVAN) 80 mg tablet, Take 80 mg by mouth daily (Patient not taking: Reported on 4/29/2024), Disp: , Rfl:       Armani Rocha PA-C    Scribe Attestation      I,:  Armani Rocha PA-C am acting as a scribe while in the presence of the attending physician.:       I,:  Akhil Carvajal MD personally performed the services described in this documentation    as scribed in my presence.:

## 2024-04-30 ENCOUNTER — APPOINTMENT (OUTPATIENT)
Dept: PHYSICAL THERAPY | Age: 69
End: 2024-04-30
Payer: MEDICARE

## 2024-05-02 ENCOUNTER — APPOINTMENT (OUTPATIENT)
Dept: PHYSICAL THERAPY | Age: 69
End: 2024-05-02
Payer: MEDICARE

## 2024-05-08 ENCOUNTER — OFFICE VISIT (OUTPATIENT)
Dept: OBGYN CLINIC | Facility: OTHER | Age: 69
End: 2024-05-08

## 2024-05-08 ENCOUNTER — OFFICE VISIT (OUTPATIENT)
Dept: PHYSICAL THERAPY | Age: 69
End: 2024-05-08
Payer: MEDICARE

## 2024-05-08 VITALS
HEART RATE: 73 BPM | BODY MASS INDEX: 27.83 KG/M2 | DIASTOLIC BLOOD PRESSURE: 74 MMHG | HEIGHT: 64 IN | WEIGHT: 163 LBS | SYSTOLIC BLOOD PRESSURE: 125 MMHG

## 2024-05-08 DIAGNOSIS — Z96.652 S/P TOTAL KNEE ARTHROPLASTY, LEFT: Primary | ICD-10-CM

## 2024-05-08 DIAGNOSIS — M17.12 PRIMARY OSTEOARTHRITIS OF LEFT KNEE: Primary | ICD-10-CM

## 2024-05-08 PROCEDURE — 97110 THERAPEUTIC EXERCISES: CPT | Performed by: PHYSICAL THERAPIST

## 2024-05-08 PROCEDURE — 99024 POSTOP FOLLOW-UP VISIT: CPT | Performed by: ORTHOPAEDIC SURGERY

## 2024-05-08 NOTE — PROGRESS NOTES
"Orthopaedic Surgery - Office Note  Thao Ford (68 y.o. female)   : 1955   MRN: [id number]  Encounter Date: 2024    Assessment / Plan  Status post left total knee arthroplasty with robotic assistance on 3/12/2024  Status post fall 2024 on the anterior aspect of the left knee which was closed in the emergency room      Sutures were removed in the office today   Patient may restart physical therapy and has no restrictions   Continue taking Tylenol as needed for pain relief   Will repeat XR left knee at the next office visit   Follow-up:  Return in about 2 months (around 2024) for PO Left TKA .          History of Present Illness   Thao Ford is a 68 y.o. female who presents today 8 weeks status post left total knee arthroplasty with robotic on 3/12/2024 and Status post fall 2024 on the anterior aspect of the left knee which was closed in the emergency room.  Patient states she is doing well.  She states she canceled her last 2 physical therapy appointments until she was seen today.  Shee has finished with the Keflex that was prescribed by the ED.  She has been active and has been doing some gardening around the home.  She has been taking Tylenol as needed for pain relief.        Review of Systems  Pertinent items are noted in HPI.  All other systems were reviewed and are negative.      Physical Exam  /74   Pulse 73   Ht 5' 4\" (1.626 m)   Wt 73.9 kg (163 lb)   BMI 27.98 kg/m²   Cons: Appears well.  No apparent distress.  Psych: Alert. Oriented x3.  Mood and affect normal.  Eyes: PERRLA, EOMI  Resp: Normal effort.  No audible wheezing or stridor.  CV: Palpable pulse.  No discernable arrhythmia.  No LE edema.  Lymph:  No palpable cervical, axillary, or inguinal lymphadenopathy.  Skin: Warm.  No palpable masses.  No visible lesions.  Neuro: Normal muscle tone.  Normal and symmetric DTR's.     Left Knee Exam  Alignment:  Normal knee alignment.  Inspection:  No swelling. No " ecchymosis. Incision clean and dry. Incision healed. Sutures intact over incision site   Palpation:  No tenderness.  ROM:  Knee Extension 0. Knee Flexion 115.  Strength:  Not tested.  Stability:  Not tested.  Tests:  No pertinent positive or negative tests.  Patella:  Not tested.  Neurovascular:  Sensation intact in DP/SP/Montoya/Sa/T nerve distributions.  2+ DP & PT pulses.  Gait:  Normal.       Studies Reviewed  No studies to review      Procedures  No procedures today.    Medical, Surgical, Family, and Social History  The patient's medical history, family history, and social history, were reviewed and updated as appropriate.    Past Medical History:   Diagnosis Date    CML (chronic myelocytic leukemia) ([facility]) 2000    Disease of thyroid gland     GERD (gastroesophageal reflux disease)     Herniated lumbar intervertebral disc     L5-S1    History of transfusion     Hypertension     Septicemia ([facility])     during admission for stem cell transplant       Past Surgical History:   Procedure Laterality Date    BUNIONECTOMY Left      SECTION      COLONOSCOPY      LIMBAL STEM CELL TRANSPLANT      [place] ARTHRP KNE CONDYLE&PLATU MEDIAL&LAT COMPARTMENTS Left 3/12/2024    Procedure: ARTHROPLASTY KNEE TOTAL W ROBOT (same day dc);  Surgeon: Akhil Carvajal MD;  Location:  MAIN OR;  Service: Orthopedics       No family history on file.    Social History     Occupational History    Not on file   Tobacco Use    Smoking status: Never    Smokeless tobacco: Never   Vaping Use    Vaping status: Never Used   Substance and Sexual Activity    Alcohol use: Yes     Comment: rarely    Drug use: Never    Sexual activity: Not Currently       Allergies   Allergen Reactions    Gluten Meal - Food Allergy Diarrhea    Bactrim [Sulfamethoxazole-Trimethoprim] Other (See Comments)     Allergy from 23 years ago, doesn't remember reaction    Doxycycline Facial Swelling    Allopurinol Rash    Sulfites - Food Allergy Rash         Current  Outpatient Medications:     ascorbic Acid (VITAMIN C) 500 MG CPCR, Take 1 capsule (500 mg total) by mouth daily, Disp: 60 capsule, Rfl: 0    cholecalciferol (VITAMIN D3) 1,000 units tablet, Take 2,000 Units by mouth daily, Disp: , Rfl:     hydrochlorothiazide (HYDRODIURIL) 12.5 mg tablet, Take 12.5 mg by mouth daily, Disp: , Rfl:     levothyroxine 100 mcg tablet, Take 100 mcg by mouth daily, Disp: , Rfl:     Multiple Vitamin (multivitamin) tablet, Take 1 tablet by mouth daily, Disp: 30 tablet, Rfl: 0    Ozempic, 1 MG/DOSE, 4 MG/3ML injection pen, INJECT 1MG UNDER THE SKIN EVERY WEEK, Disp: , Rfl:     TURMERIC PO, Take 1 capsule by mouth daily, Disp: , Rfl:     acetaminophen (TYLENOL) 500 mg tablet, Take 500 mg by mouth daily at bedtime as needed for mild pain, Disp: , Rfl:     Aspirin Low Dose 81 MG EC tablet, TAKE 1 TABLET BY MOUTH 2 TIMES A DAY., Disp: 180 tablet, Rfl: 1    ferrous sulfate 324 (65 Fe) mg, Take 1 tablet (324 mg total) by mouth 2 (two) times a day before meals (Patient not taking: Reported on 3/27/2024), Disp: 60 tablet, Rfl: 0    folic acid (KP Folic Acid) 1 mg tablet, Take 1 tablet (1 mg total) by mouth daily, Disp: 30 tablet, Rfl: 0    Ibuprofen-diphenhydrAMINE Cit 200-38 MG TABS, Take by mouth daily at bedtime as needed (Patient not taking: Reported on 2/28/2024), Disp: , Rfl:     ondansetron (ZOFRAN-ODT) 4 mg disintegrating tablet, Take 1 tablet (4 mg total) by mouth every 8 (eight) hours as needed for nausea or vomiting, Disp: 15 tablet, Rfl: 0    semaglutide, 0.25 or 0.5 mg/dose, (Ozempic, 0.25 or 0.5 MG/DOSE,) 2 mg/1.5 mL injection pen, Inject 1 mg under the skin, Disp: , Rfl:     valsartan (DIOVAN) 80 mg tablet, Take 80 mg by mouth daily (Patient not taking: Reported on 4/29/2024), Disp: , Rfl:       Mary Montes    Scribe Attestation      I,:   am acting as a scribe while in the presence of the attending physician.:       I,:   personally performed the services described in this  documentation    as scribed in my presence.:

## 2024-05-08 NOTE — PROGRESS NOTES
"Daily Note     Today's date: 2024  Patient name: Thao Ford  : 1955  MRN: [id number]  Referring provider: Akhil Carvajal, *  Dx:   Encounter Diagnosis     ICD-10-CM    1. S/P total knee arthroplasty, left  Z96.652                      Subjective: Pt reports progressive improvement since fall       Objective: See treatment diary below      Assessment: Tolerated treatment well. Patient demonstrated fatigue post treatment, would benefit from continued PT, and pt tolerated modified session       Plan: Continue per plan of care.  Progress treatment as tolerated.       Precautions: Hx of cancer. LBP. Hx bunionectomy, L knee arthroplasty sx on 3/12/24    Post-Op HEP Handout provided with marco antonio.                                            Visit    Manuals         PROM RICO Ext 0*, Flx 122*  * flx RICO    Modified TT stretch   KM                                          Neuro Re-Ed         SS/ Heel-->toe on foam          SLS Foam   5x20s      Tandem w Cone Passes  5' firm 2x firm, 2x foam      Hurdles FW 2ft, FW 1ft, SS  5x ea  Foam + hurdles 5x ea Foam + hurdles 5x ea Foam + hurdles 5x ea                                                                                             Ther Ex         BIKE 15' 10' BIKE 15' 15'  15'    Wall heel slides         Step ups  Lateral 6\" 30x, FW 8\" 1HHA 20x FW 8\" 30x, Lateral 6\" 30x Bosu 30x, 6\" lateral 30x     Lat step ups         Slant board stretch 3x1 min L3 3x1 min L4 3x1 min L4 3x1 min L4 3x1 min L4 3x1 min L4   HR 4x10 3x10 3x10      LAQ  3x10 7.5# 4x10 10# 5x10 10# 5x10 10#    Sled Push/Pull 5x50ft 0#  15# 5x50ft 15# 5x50ft 15# 5x50ft 15# 5x50ft   HS Curl SL 10# 2x10 30# DL x10 15# 3x10 SL 15# 3x10 SL 15# 3x10 SL 15# 3x10 SL    Hip ABd 25# 3x10 30# 3x10 35# 3x10 35# 3x10 35# 3x10 35# 3x10   SL Leg Press 30# 3x10 35# 3x10 SL 40# 3x10 SL Deferred LBP                       Ther Activity                                         "                                                  Gait Training                                             Modalities

## 2024-05-16 ENCOUNTER — OFFICE VISIT (OUTPATIENT)
Dept: PHYSICAL THERAPY | Age: 69
End: 2024-05-16
Payer: MEDICARE

## 2024-05-16 DIAGNOSIS — Z96.652 S/P TOTAL KNEE ARTHROPLASTY, LEFT: Primary | ICD-10-CM

## 2024-05-16 PROCEDURE — 97110 THERAPEUTIC EXERCISES: CPT | Performed by: PHYSICAL THERAPIST

## 2024-05-16 NOTE — PROGRESS NOTES
"Daily Note     Today's date: 2024  Patient name: Thao Ford  : 1955  MRN: [id number]  Referring provider: Akhil Carvajal, *  Dx:   Encounter Diagnosis     ICD-10-CM    1. S/P total knee arthroplasty, left  Z96.652                      Subjective: no complaints       Objective: See treatment diary below      Assessment: Tolerated treatment well. Patient demonstrated fatigue post treatment and would benefit from continued PT      Plan: Continue per plan of care.  Progress treatment as tolerated.       Precautions: Hx of cancer. LBP. Hx bunionectomy, L knee arthroplasty sx on 3/12/24    Post-Op HEP Handout provided with marco antonio.                                            Visit    Manuals          PROM RICO Ext 0*, Flx 122*  * flx RICO     Modified TT stretch   KM                                               Neuro Re-Ed          SS/ Heel-->toe on foam           SLS Foam   5x20s       Tandem w Cone Passes  5' firm 2x firm, 2x foam       Hurdles FW 2ft, FW 1ft, SS  5x ea  Foam + hurdles 5x ea Foam + hurdles 5x ea Foam + hurdles 5x ea                                                                                                        Ther Ex          BIKE 15' 10' BIKE 15' 15'  15'     Wall heel slides          Step ups  Lateral 6\" 30x, FW 8\" 1HHA 20x FW 8\" 30x, Lateral 6\" 30x Bosu 30x, 6\" lateral 30x      Lat step ups          Slant board stretch 3x1 min L3 3x1 min L4 3x1 min L4 3x1 min L4 3x1 min L4 3x1 min L4 3x1 min L4   HR 4x10 3x10 3x10       LAQ  3x10 7.5# 4x10 10# 5x10 10# 5x10 10#     Sled Push/Pull 5x50ft 0#  15# 5x50ft 15# 5x50ft 15# 5x50ft 15# 5x50ft 20# 4x50ft   HS Curl SL 10# 2x10 30# DL x10 15# 3x10 SL 15# 3x10 SL 15# 3x10 SL 15# 3x10 SL  30# DL 3x15   Hip ABd 25# 3x10 30# 3x10 35# 3x10 35# 3x10 35# 3x10 35# 3x10 35# 3x15   SL Leg Press 30# 3x10 35# 3x10 SL 40# 3x10 SL Deferred LBP                          Ther Activity                         "                                                                            Gait Training                                                  Modalities

## 2024-05-21 ENCOUNTER — OFFICE VISIT (OUTPATIENT)
Dept: PHYSICAL THERAPY | Age: 69
End: 2024-05-21
Payer: MEDICARE

## 2024-05-21 DIAGNOSIS — Z96.652 S/P TOTAL KNEE ARTHROPLASTY, LEFT: Primary | ICD-10-CM

## 2024-05-21 PROCEDURE — 97110 THERAPEUTIC EXERCISES: CPT | Performed by: PHYSICAL THERAPIST

## 2024-05-21 NOTE — PROGRESS NOTES
"Daily Note     Today's date: 2024  Patient name: Thao Ford  : 1955  MRN: [id number]  Referring provider: Akhil Carvajal, *  Dx:   Encounter Diagnosis     ICD-10-CM    1. S/P total knee arthroplasty, left  Z96.652                      Subjective: No major changes       Objective: See treatment diary below      Assessment: Tolerated treatment well. Patient tolerated addition of half lunge exercise with minimal pain to right knee and not pain to left knee. Patient demonstrated fatigue post treatment and would benefit from continued PT      Plan: Continue per plan of care.  Progress treatment as tolerated.       Precautions: Hx of cancer. LBP. Hx bunionectomy, L knee arthroplasty sx on 3/12/24    Post-Op HEP Handout provided with marco antonio.                                            Visit     Manuals            PROM RICO Ext 0*, Flx 122*  * flx RICO       Modified TT stretch   KM                                                         Neuro Re-Ed            SS/ Heel-->toe on foam             SLS Foam   5x20s         Tandem w Cone Passes  5' firm 2x firm, 2x foam         Hurdles FW 2ft, FW 1ft, SS  5x ea  Foam + hurdles 5x ea Foam + hurdles 5x ea Foam + hurdles 5x ea  Foam + hurdles 5x ea    Tandem airex + ball toss            Tandem walk + farmer's carry                                                                                                             Ther Ex            BIKE 15' 10' BIKE 15' 15'  15'       Wall heel slides            Step ups  Lateral 6\" 30x, FW 8\" 1HHA 20x FW 8\" 30x, Lateral 6\" 30x Bosu 30x, 6\" lateral 30x        Lat step ups            Slant board stretch 3x1 min L3 3x1 min L4 3x1 min L4 3x1 min L4 3x1 min L4 3x1 min L4 3x1 min L4 3x1 min L4    HR 4x10 3x10 3x10         LAQ  3x10 7.5# 4x10 10# 5x10 10# 5x10 10#       Sled Push/Pull 5x50ft 0#  15# 5x50ft 15# 5x50ft 15# 5x50ft 15# 5x50ft 20# 4x50ft 20# 4x50ft    HS Curl SL 10# " 2x10 30# DL x10 15# 3x10 SL 15# 3x10 SL 15# 3x10 SL 15# 3x10 SL  30# DL 3x15 30# DL 3x15    Hip ABd 25# 3x10 30# 3x10 35# 3x10 35# 3x10 35# 3x10 35# 3x10 35# 3x15 35# 3x15    SL Leg Press 30# 3x10 35# 3x10 SL 40# 3x10 SL Deferred LBP        Half Lunge to Box         2x12                Ther Activity                                                                                                                        Gait Training                                                            Modalities

## 2024-05-23 ENCOUNTER — OFFICE VISIT (OUTPATIENT)
Dept: PHYSICAL THERAPY | Age: 69
End: 2024-05-23
Payer: MEDICARE

## 2024-05-23 DIAGNOSIS — Z96.652 S/P TOTAL KNEE ARTHROPLASTY, LEFT: Primary | ICD-10-CM

## 2024-05-23 PROCEDURE — 97164 PT RE-EVAL EST PLAN CARE: CPT | Performed by: PHYSICAL THERAPIST

## 2024-05-23 PROCEDURE — 97110 THERAPEUTIC EXERCISES: CPT | Performed by: PHYSICAL THERAPIST

## 2024-05-23 NOTE — PROGRESS NOTES
"Daily Note     Today's date: 2024  Patient name: Thao Ford  : 1955  MRN: [id number]  Referring provider: Akhil Carvajal, *  Dx:   Encounter Diagnosis     ICD-10-CM    1. S/P total knee arthroplasty, left  Z96.652           Start Time: 1445  Stop Time: 1535  Total time in clinic (min): 50 minutes    Subjective: Pt reports she experienced elevated HR a couple hours after the last session. She was concerned so she made an appt with her cardiologist for . She has no new concerns or complaints about her knee.       Objective: See treatment diary below      Assessment: Tolerated treatment well. Pt tolerated addition of balance exercises, but reported some discomfort in right knee. Patient  is self-discharging from PT at this time.       Plan:  Pt is self-discharging to continue HEP at home.      Precautions: Hx of cancer. LBP. Hx bunionectomy, L knee arthroplasty sx on 3/12/24    Post-Op HEP Handout provided with marco antonio.                                            Visit    Manuals            PROM RICO Ext 0*, Flx 122*  * flx RICO       Modified TT stretch   KM                                                         Neuro Re-Ed            SS/ Heel-->toe on foam             SLS Foam   5x20s         Tandem w Cone Passes  5' firm 2x firm, 2x foam         Hurdles FW 2ft, FW 1ft, SS  5x ea  Foam + hurdles 5x ea Foam + hurdles 5x ea Foam + hurdles 5x ea  Foam + hurdles 5x ea    Alt Cone Taps          3x10   Bosu Lunges          2x8ea                           There Exercise            BIKE 15' 10' BIKE 15' 15'  15'    15'   Wall heel slides            Step ups  Lateral 6\" 30x, FW 8\" 1HHA 20x FW 8\" 30x, Lateral 6\" 30x Bosu 30x, 6\" lateral 30x        Lat step ups            Slant board stretch 3x1 min L3 3x1 min L4 3x1 min L4 3x1 min L4 3x1 min L4 3x1 min L4 3x1 min L4 3x1 min L4 3x1min L4   HR 4x10 3x10 3x10         LAQ  3x10 7.5# 4x10 10# 5x10 10# " 5x10 10#       Sled Push/Pull 5x50ft 0#  15# 5x50ft 15# 5x50ft 15# 5x50ft 15# 5x50ft 20# 4x50ft 20# 4x50ft 20# 4x50ft   HS Curl SL 10# 2x10 30# DL x10 15# 3x10 SL 15# 3x10 SL 15# 3x10 SL 15# 3x10 SL  30# DL 3x15 30# DL 3x15 30# DL 3x15   Hip ABd 25# 3x10 30# 3x10 35# 3x10 35# 3x10 35# 3x10 35# 3x10 35# 3x15 35# 3x15 35# 3x15    SL Leg Press 30# 3x10 35# 3x10 SL 40# 3x10 SL Deferred LBP        Half Lunge to Box         2x12                Ther Activity                                                                                                                        Gait Training                                                            Modalities

## 2024-05-28 ENCOUNTER — APPOINTMENT (OUTPATIENT)
Dept: PHYSICAL THERAPY | Age: 69
End: 2024-05-28
Payer: MEDICARE

## 2024-05-30 ENCOUNTER — APPOINTMENT (OUTPATIENT)
Dept: PHYSICAL THERAPY | Age: 69
End: 2024-05-30
Payer: MEDICARE

## 2024-07-16 LAB — HBA1C MFR BLD HPLC: 5.9 %

## 2024-07-17 ENCOUNTER — OFFICE VISIT (OUTPATIENT)
Dept: OBGYN CLINIC | Facility: OTHER | Age: 69
End: 2024-07-17

## 2024-07-17 ENCOUNTER — APPOINTMENT (OUTPATIENT)
Dept: RADIOLOGY | Facility: OTHER | Age: 69
End: 2024-07-17
Payer: MEDICARE

## 2024-07-17 VITALS
HEIGHT: 64 IN | HEART RATE: 76 BPM | WEIGHT: 163 LBS | BODY MASS INDEX: 27.83 KG/M2 | DIASTOLIC BLOOD PRESSURE: 72 MMHG | SYSTOLIC BLOOD PRESSURE: 117 MMHG

## 2024-07-17 DIAGNOSIS — Z96.652 STATUS POST TOTAL KNEE REPLACEMENT, LEFT: Primary | ICD-10-CM

## 2024-07-17 DIAGNOSIS — Z96.652 STATUS POST TOTAL KNEE REPLACEMENT, LEFT: ICD-10-CM

## 2024-07-17 PROCEDURE — 99024 POSTOP FOLLOW-UP VISIT: CPT | Performed by: ORTHOPAEDIC SURGERY

## 2024-07-17 PROCEDURE — 73562 X-RAY EXAM OF KNEE 3: CPT

## 2024-07-17 NOTE — PROGRESS NOTES
"Orthopaedic Surgery - Office Note  Thao Ford (68 y.o. female)   : 1955   MRN: [id number]  Encounter Date: 2024    Assessment / Plan    Status post left total knee arthroplasty with robotic assistance on 3/12/2024  Status post fall 2024 on the anterior aspect of the left knee which was closed in the emergency room    Educated on circulation timelines.  Educated that knee replacements can last 15-20 years.  Continue taking tylenol prn.  Follow-up:  Return in about 8 months (around 3/17/2025), or if symptoms worsen or fail to improve.      Chief Complaint / Date of Onset  S/p left total knee - 3/12/2024  Injury Mechanism / Date  New injury - Fall on 2024  Surgery / Date  None    History of Present Illness   Thao Ford is a 68 y.o. female who presents for 4 month follow up Status post left total knee arthroplasty with robotic assistance on 3/12/2024.  She states she had a fall back in April and her incision was opened and had to go to the ED to have it closed.  Since then she has experience significant improvement.  She is able to perform ADLs such as gardening with minimal to no discomfort.  She denies numbness tingling at this time.    Treatment Summary  Medications / Modalities  Tylenol 500mg at night prn  Bracing / Immobilization  None  Physical Therapy  None  Injections  None  Prior Surgeries  None  Other Treatments  None    Employment / Current Status  Retired    Sport / Organization / Current Status  Gardening      Review of Systems  Pertinent items are noted in HPI.  All other systems were reviewed and are negative.      Physical Exam  /72   Pulse 76   Ht 5' 4\" (1.626 m)   Wt 73.9 kg (163 lb)   BMI 27.98 kg/m²   Cons: Appears well.  No apparent distress.  Psych: Alert. Oriented x3.  Mood and affect normal.  Eyes: PERRLA, EOMI  Resp: Normal effort.  No audible wheezing or stridor.  CV: Palpable pulse.  No discernable arrhythmia.  No LE edema.  Lymph:  No palpable " cervical, axillary, or inguinal lymphadenopathy.  Skin: Warm.  No palpable masses.  No visible lesions.  Neuro: Normal muscle tone.  Normal and symmetric DTR's.     Left Knee Exam  Alignment:  Normal knee alignment.  Inspection:  No swelling. No edema. No erythema. Incision healed.  Palpation:   Slight tenderness at lateral joint line.  ROM:  Knee Extension 0. Knee Flexion 125.  Strength:  5/5 quadriceps and hamstrings.  Stability:  No objective knee instability. Stable Varus / Valgus stress, Lachman, and Posterior drawer.  Tests:  No pertinent positive or negative tests.  Patella:  Patella tracks centrally without crepitus.  Neurovascular:  Sensation intact in DP/SP/Montoya/Sa/T nerve distributions.  2+ DP & PT pulses.  Gait:  Smooth.       Studies Reviewed  XR of left knee - good hardware alignment with no signs of loosening.      Procedures  No procedures today.    Medical, Surgical, Family, and Social History  The patient's medical history, family history, and social history, were reviewed and updated as appropriate.    Past Medical History:   Diagnosis Date    CML (chronic myelocytic leukemia) ([facility]) 2000    Disease of thyroid gland     GERD (gastroesophageal reflux disease)     Herniated lumbar intervertebral disc     L5-S1    History of transfusion     Hypertension     Septicemia ([facility])     during admission for stem cell transplant       Past Surgical History:   Procedure Laterality Date    BUNIONECTOMY Left      SECTION      COLONOSCOPY      LIMBAL STEM CELL TRANSPLANT      [place] ARTHRP KNE CONDYLE&PLATU MEDIAL&LAT COMPARTMENTS Left 3/12/2024    Procedure: ARTHROPLASTY KNEE TOTAL W ROBOT (same day dc);  Surgeon: Akhil Carvajal MD;  Location: [facility] OR;  Service: Orthopedics       History reviewed. No pertinent family history.    Social History     Occupational History    Not on file   Tobacco Use    Smoking status: Never    Smokeless tobacco: Never   Vaping Use    Vaping status: Never Used    Substance and Sexual Activity    Alcohol use: Yes     Comment: rarely    Drug use: Never    Sexual activity: Not Currently       Allergies   Allergen Reactions    Gluten Meal - Food Allergy Diarrhea    Bactrim [Sulfamethoxazole-Trimethoprim] Other (See Comments)     Allergy from 23 years ago, doesn't remember reaction    Doxycycline Facial Swelling    Allopurinol Rash    Sulfites - Food Allergy Rash         Current Outpatient Medications:     ascorbic Acid (VITAMIN C) 500 MG CPCR, Take 1 capsule (500 mg total) by mouth daily, Disp: 60 capsule, Rfl: 0    cholecalciferol (VITAMIN D3) 1,000 units tablet, Take 2,000 Units by mouth daily, Disp: , Rfl:     hydrochlorothiazide (HYDRODIURIL) 12.5 mg tablet, Take 12.5 mg by mouth daily, Disp: , Rfl:     levothyroxine 100 mcg tablet, Take 100 mcg by mouth daily, Disp: , Rfl:     Ozempic, 1 MG/DOSE, 4 MG/3ML injection pen, INJECT 1MG UNDER THE SKIN EVERY WEEK, Disp: , Rfl:     TURMERIC PO, Take 1 capsule by mouth daily, Disp: , Rfl:     acetaminophen (TYLENOL) 500 mg tablet, Take 500 mg by mouth daily at bedtime as needed for mild pain, Disp: , Rfl:     Aspirin Low Dose 81 MG EC tablet, TAKE 1 TABLET BY MOUTH 2 TIMES A DAY., Disp: 180 tablet, Rfl: 1    ferrous sulfate 324 (65 Fe) mg, Take 1 tablet (324 mg total) by mouth 2 (two) times a day before meals (Patient not taking: Reported on 3/27/2024), Disp: 60 tablet, Rfl: 0    folic acid (KP Folic Acid) 1 mg tablet, Take 1 tablet (1 mg total) by mouth daily, Disp: 30 tablet, Rfl: 0    Ibuprofen-diphenhydrAMINE Cit 200-38 MG TABS, Take by mouth daily at bedtime as needed (Patient not taking: Reported on 2/28/2024), Disp: , Rfl:     Multiple Vitamin (multivitamin) tablet, Take 1 tablet by mouth daily, Disp: 30 tablet, Rfl: 0    ondansetron (ZOFRAN-ODT) 4 mg disintegrating tablet, Take 1 tablet (4 mg total) by mouth every 8 (eight) hours as needed for nausea or vomiting, Disp: 15 tablet, Rfl: 0    semaglutide, 0.25 or 0.5 mg/dose,  (Ozempic, 0.25 or 0.5 MG/DOSE,) 2 mg/1.5 mL injection pen, Inject 1 mg under the skin, Disp: , Rfl:     valsartan (DIOVAN) 80 mg tablet, Take 80 mg by mouth daily (Patient not taking: Reported on 4/29/2024), Disp: , Rfl:       Forest Lindsey    Scribe Attestation      I,:  Forest Lindsey am acting as a scribe while in the presence of the attending physician.:       I,:  Akhil Carvajal MD personally performed the services described in this documentation    as scribed in my presence.:

## 2024-08-02 ENCOUNTER — OFFICE VISIT (OUTPATIENT)
Dept: INTERNAL MEDICINE CLINIC | Age: 69
End: 2024-08-02
Payer: MEDICARE

## 2024-08-02 VITALS
TEMPERATURE: 98 F | DIASTOLIC BLOOD PRESSURE: 62 MMHG | SYSTOLIC BLOOD PRESSURE: 108 MMHG | WEIGHT: 167 LBS | HEIGHT: 64 IN | HEART RATE: 72 BPM | BODY MASS INDEX: 28.51 KG/M2 | OXYGEN SATURATION: 98 %

## 2024-08-02 DIAGNOSIS — E03.8 HYPOTHYROIDISM DUE TO HASHIMOTO'S THYROIDITIS: Primary | ICD-10-CM

## 2024-08-02 DIAGNOSIS — R73.03 PREDIABETES: ICD-10-CM

## 2024-08-02 DIAGNOSIS — Z76.89 ESTABLISHING CARE WITH NEW DOCTOR, ENCOUNTER FOR: ICD-10-CM

## 2024-08-02 DIAGNOSIS — Z94.81 BONE MARROW TRANSPLANT STATUS (HCC): ICD-10-CM

## 2024-08-02 DIAGNOSIS — E06.3 HYPOTHYROIDISM DUE TO HASHIMOTO'S THYROIDITIS: Primary | ICD-10-CM

## 2024-08-02 DIAGNOSIS — Z12.31 SCREENING MAMMOGRAM, ENCOUNTER FOR: ICD-10-CM

## 2024-08-02 DIAGNOSIS — Z11.59 ENCOUNTER FOR HEPATITIS C SCREENING TEST FOR LOW RISK PATIENT: ICD-10-CM

## 2024-08-02 DIAGNOSIS — I10 ESSENTIAL HYPERTENSION: ICD-10-CM

## 2024-08-02 DIAGNOSIS — C95.01 ACUTE LEUKEMIA IN REMISSION (HCC): ICD-10-CM

## 2024-08-02 DIAGNOSIS — Z96.652 STATUS POST TOTAL KNEE REPLACEMENT, LEFT: ICD-10-CM

## 2024-08-02 DIAGNOSIS — C92.10 CML (CHRONIC MYELOCYTIC LEUKEMIA) (HCC): ICD-10-CM

## 2024-08-02 DIAGNOSIS — Z78.0 POST-MENOPAUSAL: ICD-10-CM

## 2024-08-02 PROCEDURE — 99203 OFFICE O/P NEW LOW 30 MIN: CPT | Performed by: FAMILY MEDICINE

## 2024-08-02 RX ORDER — TIRZEPATIDE 7.5 MG/.5ML
7.5 INJECTION, SOLUTION SUBCUTANEOUS
COMMUNITY
Start: 2024-07-26

## 2024-08-02 RX ORDER — AMOXICILLIN 500 MG/1
CAPSULE ORAL
COMMUNITY
Start: 2024-05-24

## 2024-08-02 NOTE — PROGRESS NOTES
Assessment/Plan:    1. Hypothyroidism due to Hashimoto's thyroiditis  2. Essential hypertension  3. CML (chronic myelocytic leukemia) (HCC)  4. Bone marrow transplant status (HCC)  5. Status post total knee replacement, left  6. Establishing care with new doctor, encounter for  7. Prediabetes  8. Post-menopausal  -     DXA bone density spine hip and pelvis; Future; Expected date: 08/02/2024  9. Acute leukemia in remission (HCC)  10. Encounter for hepatitis C screening test for low risk patient  -     Hepatitis C antibody; Future  11. Screening mammogram, encounter for  -     Mammo screening bilateral w 3d & cad; Future          There are no Patient Instructions on file for this visit.    Return for Annual physical.    Subjective:      Patient ID: Thao Ford is a 68 y.o. female.    Chief Complaint   Patient presents with    Establish Care     New patient appointment   Rheumatology, Cardiology  and GYN referral needed.       Hypertension    Hypothyroidism    Health Maint.      Referrals needed   Declined mammogram at this time        HPI    Patient coming in to establish care, moved from [place] to the area.  Had total knee replacement done in this area on the left side and needed to establish with primary care doctor.  Has a history of osteoarthritis and CML treated with stem cell transplant in 2000.  Now doing well.  Follows with endocrinology for prediabetes and is on Mounjaro, he monitors her thyroid as well and checks cholesterol.  She states she just had blood work done from him which are not available for us.  She is seeing him every 3 to 4 months at the moment.  Patient states her mammogram bone density study was about 2 years ago.  Feeling well without any complaints or concerns.  Patient never had an annual wellness done.  Colonoscopy results and immunization results are at her previous doctors and patient will get that for us      The following portions of the patient's history were reviewed and  "updated as appropriate: allergies, current medications, past family history, past medical history, past social history, past surgical history and problem list.    Review of Systems    Constitutional:  Denies fever or chills   Eyes:  Denies double , blurry vision or eye pain  HENT:  Denies nasal congestion, sore throat or new hearing issues  Respiratory:  Denies cough or shortness of breath or wheezing  Cardiovascular:  Denies palpitations or chest pain  GI:  Denies abdominal pain, nausea, or vomiting, no loose stools, no reflux  Integument:  Denies rash , no open areas  Neurologic:  Denies headache or focal weakness, no dizziness  : no dysuria, or hematuria      Current Outpatient Medications   Medication Sig Dispense Refill    amoxicillin (AMOXIL) 500 mg capsule TAKE 2 CAPSULES (1,000 MG TOTAL) BY MOUTH 60 MINUTES PRE-PROCEDURE FOR 1 DAY      ascorbic Acid (VITAMIN C) 500 MG CPCR Take 1 capsule (500 mg total) by mouth daily 60 capsule 0    cholecalciferol (VITAMIN D3) 1,000 units tablet Take 2,000 Units by mouth daily      hydrochlorothiazide (HYDRODIURIL) 12.5 mg tablet Take 12.5 mg by mouth daily      levothyroxine 100 mcg tablet Take 100 mcg by mouth daily      Mounjaro 7.5 MG/0.5ML Inject 7.5 mg under the skin every 7 days      TURMERIC PO Take 1 capsule by mouth daily       No current facility-administered medications for this visit.       Objective:    /62 (BP Location: Left arm, Patient Position: Sitting, Cuff Size: Adult)   Pulse 72   Temp 98 °F (36.7 °C)   Ht 5' 4\" (1.626 m)   Wt 75.8 kg (167 lb)   SpO2 98%   BMI 28.67 kg/m²        Physical Exam       Constitutional:  Well developed, well nourished, no acute distress, non-toxic appearance   Eyes:  PERRL, conjunctiva normal , non icteric sclera  HENT:  Atraumatic, oropharynx moist. Neck-  supple   Respiratory:  CTA b/l, normal breath sounds, no rales, no wheezing   Cardiovascular:  RRR, no murmurs, no LE edema b/l  GI:  Soft, nondistended, " normal bowel sounds x 4, nontender, no organomegaly, no mass, no rebound, no guarding   Neurologic:  no focal deficits noted   Psychiatric:  Speech and behavior appropriate , AAO x 3      Rut Yeh DO

## 2024-08-14 ENCOUNTER — HOSPITAL ENCOUNTER (OUTPATIENT)
Dept: RADIOLOGY | Age: 69
Discharge: HOME | End: 2024-08-14
Payer: MEDICARE

## 2024-08-14 VITALS — WEIGHT: 163 LBS | BODY MASS INDEX: 27.83 KG/M2 | HEIGHT: 64 IN

## 2024-08-14 DIAGNOSIS — Z12.31 SCREENING MAMMOGRAM, ENCOUNTER FOR: ICD-10-CM

## 2024-08-14 PROCEDURE — 77067 SCR MAMMO BI INCL CAD: CPT

## 2024-08-14 PROCEDURE — 77063 BREAST TOMOSYNTHESIS BI: CPT

## 2024-10-23 ENCOUNTER — HOSPITAL ENCOUNTER (OUTPATIENT)
Dept: RADIOLOGY | Age: 69
Discharge: HOME | End: 2024-10-23

## 2024-10-23 DIAGNOSIS — Z78.0 POST-MENOPAUSAL: ICD-10-CM

## 2024-10-28 ENCOUNTER — TELEPHONE (OUTPATIENT)
Dept: INTERNAL MEDICINE CLINIC | Facility: OTHER | Age: 69
End: 2024-10-28

## 2024-10-28 NOTE — TELEPHONE ENCOUNTER
Called pt to move AWV to 2024, offered virtual.  Pt has a lot going on in December and will be traveling.  Preferred to keep AWV in jan 2025

## 2024-11-04 ENCOUNTER — OFFICE VISIT (OUTPATIENT)
Dept: URGENT CARE | Age: 69
End: 2024-11-04
Payer: MEDICARE

## 2024-11-04 VITALS
BODY MASS INDEX: 27.66 KG/M2 | WEIGHT: 162 LBS | HEART RATE: 96 BPM | OXYGEN SATURATION: 99 % | RESPIRATION RATE: 18 BRPM | HEIGHT: 64 IN | TEMPERATURE: 99.5 F | SYSTOLIC BLOOD PRESSURE: 124 MMHG | DIASTOLIC BLOOD PRESSURE: 78 MMHG

## 2024-11-04 DIAGNOSIS — J06.9 VIRAL URI WITH COUGH: Primary | ICD-10-CM

## 2024-11-04 DIAGNOSIS — R52 BODY ACHES: ICD-10-CM

## 2024-11-04 LAB
SARS-COV-2 AG UPPER RESP QL IA: NEGATIVE
VALID CONTROL: NORMAL

## 2024-11-04 PROCEDURE — G0463 HOSPITAL OUTPT CLINIC VISIT: HCPCS | Performed by: STUDENT IN AN ORGANIZED HEALTH CARE EDUCATION/TRAINING PROGRAM

## 2024-11-04 PROCEDURE — 87811 SARS-COV-2 COVID19 W/OPTIC: CPT | Performed by: STUDENT IN AN ORGANIZED HEALTH CARE EDUCATION/TRAINING PROGRAM

## 2024-11-04 PROCEDURE — 99213 OFFICE O/P EST LOW 20 MIN: CPT | Performed by: STUDENT IN AN ORGANIZED HEALTH CARE EDUCATION/TRAINING PROGRAM

## 2024-11-04 RX ORDER — FLUTICASONE PROPIONATE 50 MCG
1 SPRAY, SUSPENSION (ML) NASAL DAILY
Qty: 11.1 ML | Refills: 0 | Status: SHIPPED | OUTPATIENT
Start: 2024-11-04

## 2024-11-05 NOTE — PATIENT INSTRUCTIONS
Your rapid COVID test was negative.  Your symptoms are consistent with a viral upper respiratory infection.    To help clear out the mucus and reduce post-nasal drip which can cause sore throat and cough - use saline nasal spray or saline nasal rinse (with distilled or boiled water).  I recommend using this around the time of showers of the shower steam can also help to loosen any mucus.  Use Flonase nasal spray before bed to help reduce postnasal drip overnight and coughing overnight.  To soothe sore throat, you may try warm tea with honey, warm salt water gargles.  You may take Tylenol or Motrin to help with fever/chills or muscle aches.  Stay well hydrated and get plenty of rest.     If your symptoms are worsening or fail to improve in the coming days, please return to see us or schedule with your PCP.  If you develop any severe/worsening symptoms please go to the ER.      Nasal Saline Rinse:

## 2024-11-05 NOTE — PROGRESS NOTES
[facility]        NAME: Thao Ford is a 69 y.o. female  : 1955    MRN: [id number]  DATE: 2024  TIME: 8:02 PM    Assessment and Plan   Viral URI with cough [J06.9]  1. Viral URI with cough  fluticasone (FLONASE) 50 mcg/act nasal spray      2. Body aches  Poct Covid 19 Rapid Antigen Test      Rapid covid neg.      Patient Instructions   Your rapid COVID test was negative.  Your symptoms are consistent with a viral upper respiratory infection.    To help clear out the mucus and reduce post-nasal drip which can cause sore throat and cough - use saline nasal spray or saline nasal rinse (with distilled or boiled water).  I recommend using this around the time of showers of the shower steam can also help to loosen any mucus.  Use Flonase nasal spray before bed to help reduce postnasal drip overnight and coughing overnight.  To soothe sore throat, you may try warm tea with honey, warm salt water gargles.  You may take Tylenol or Motrin to help with fever/chills or muscle aches.  Stay well hydrated and get plenty of rest.     If your symptoms are worsening or fail to improve in the coming days, please return to see us or schedule with your PCP.  If you develop any severe/worsening symptoms please go to the ER.    Follow up with PCP in 3-5 days.  Proceed to  ER if symptoms worsen.    If tests have been performed at [facility], our office will contact you with results if changes need to be made to the care plan discussed with you at the visit.  [facility]    Chief Complaint     Chief Complaint   Patient presents with    Generalized Body Aches     Pt states she has been running fevers, body aches chills, lethargy with symptoms worsening yesterday. Has been taking tylenol for fevers.          History of Present Illness       Patient presents for symptoms that started primarily yesterday but may have started the day before.  Started with sore throat,  nasal congestion, runny nose, cough, decreased smell, back and leg aches.  Sweats and chills.  Fever.  Tried taking Tylenol so far.  No specific sick contacts, but she was with a large group of people recently.        Review of Systems   Review of Systems   All other systems reviewed and are negative.        Current Medications       Current Outpatient Medications:     ascorbic Acid (VITAMIN C) 500 MG CPCR, Take 1 capsule (500 mg total) by mouth daily, Disp: 60 capsule, Rfl: 0    cholecalciferol (VITAMIN D3) 1,000 units tablet, Take 2,000 Units by mouth daily, Disp: , Rfl:     fluticasone (FLONASE) 50 mcg/act nasal spray, 1 spray into each nostril daily, Disp: 11.1 mL, Rfl: 0    hydrochlorothiazide (HYDRODIURIL) 12.5 mg tablet, Take 12.5 mg by mouth daily, Disp: , Rfl:     levothyroxine 100 mcg tablet, Take 100 mcg by mouth daily, Disp: , Rfl:     Mounjaro 7.5 MG/0.5ML, Inject 7.5 mg under the skin every 7 days, Disp: , Rfl:     TURMERIC PO, Take 1 capsule by mouth daily, Disp: , Rfl:     amoxicillin (AMOXIL) 500 mg capsule, TAKE 2 CAPSULES (1,000 MG TOTAL) BY MOUTH 60 MINUTES PRE-PROCEDURE FOR 1 DAY (Patient not taking: Reported on 8/23/2024), Disp: , Rfl:     Current Allergies     Allergies as of 11/04/2024 - Reviewed 11/04/2024   Allergen Reaction Noted    Gluten meal - food allergy Diarrhea 08/22/2023    Bactrim [sulfamethoxazole-trimethoprim] Other (See Comments) 01/10/2024    Doxycycline Facial Swelling 02/27/2017    Allopurinol Rash 05/03/2001    Sulfites - food allergy Rash 02/20/2024            The following portions of the patient's history were reviewed and updated as appropriate: allergies, current medications, past family history, past medical history, past social history, past surgical history and problem list.     Past Medical History:   Diagnosis Date    Allergic     Arthritis 4 years ago    Cancer (HCC) March 2000    Had Bone Marrow Transplant May 30,2000 at [facility]    CML  "(chronic myelocytic leukemia) (HCC) 2000    Disease of thyroid gland     GERD (gastroesophageal reflux disease)     Herniated lumbar intervertebral disc     L5-S1    History of transfusion     Hypertension     Septicemia (HCC)     during admission for stem cell transplant    Shingles ?       Past Surgical History:   Procedure Laterality Date    BUNIONECTOMY Left      SECTION      COLONOSCOPY      LIMBAL STEM CELL TRANSPLANT      OR ARTHRP KNE CONDYLE&PLATU MEDIAL&LAT COMPARTMENTS Left 3/12/2024    Procedure: ARTHROPLASTY KNEE TOTAL W ROBOT (same day dc);  Surgeon: Akhil Carvajal MD;  [facility]: [facility];  Service: Orthopedics       Family History   Problem Relation Age of Onset    Hyperlipidemia Mother             Heart disease Mother             Thyroid disease Mother             Arthritis Mother         Osteoarthritis    Stroke Father             Hyperlipidemia Father             Heart disease Father             Diabetes Father     Heart disease Sister         Aortic valve replacement 2021    Heart disease Brother          2022    Hearing loss Brother         Hearing aides at end of life    Cancer Brother         Small cell Ca /  2018         Medications have been verified.        Objective   /78   Pulse 96   Temp 99.5 °F (37.5 °C)   Resp 18   Ht 5' 4\" (1.626 m)   Wt 73.5 kg (162 lb)   SpO2 99%   BMI 27.81 kg/m²   No LMP recorded. Patient is postmenopausal.       Physical Exam     Physical Exam  Vitals and nursing note reviewed.   Constitutional:       General: She is not in acute distress.     Appearance: Normal appearance. She is not ill-appearing or toxic-appearing.   HENT:      Head: Normocephalic and atraumatic.      Right Ear: Tympanic membrane, ear canal and external ear normal.      Left Ear: Tympanic membrane, ear canal and external ear normal.      Nose: Congestion and rhinorrhea present.    "   Mouth/Throat:      Mouth: Mucous membranes are moist.      Comments: Mild erythema with clear PND  Eyes:      Extraocular Movements: Extraocular movements intact.   Cardiovascular:      Rate and Rhythm: Normal rate and regular rhythm.      Heart sounds: Normal heart sounds.   Pulmonary:      Effort: Pulmonary effort is normal. No respiratory distress.      Breath sounds: Normal breath sounds. No stridor. No wheezing, rhonchi or rales.   Skin:     General: Skin is warm and dry.      Capillary Refill: Capillary refill takes less than 2 seconds.      Findings: No rash.   Neurological:      Mental Status: She is alert.      Gait: Gait normal.   Psychiatric:         Behavior: Behavior normal.

## 2024-11-07 ENCOUNTER — OFFICE VISIT (OUTPATIENT)
Dept: INTERNAL MEDICINE CLINIC | Age: 69
End: 2024-11-07
Payer: MEDICARE

## 2024-11-07 VITALS
DIASTOLIC BLOOD PRESSURE: 66 MMHG | WEIGHT: 162 LBS | OXYGEN SATURATION: 97 % | SYSTOLIC BLOOD PRESSURE: 122 MMHG | TEMPERATURE: 98.6 F | BODY MASS INDEX: 27.66 KG/M2 | HEIGHT: 64 IN | HEART RATE: 79 BPM

## 2024-11-07 DIAGNOSIS — J06.9 URTI (ACUTE UPPER RESPIRATORY INFECTION): Primary | ICD-10-CM

## 2024-11-07 DIAGNOSIS — B34.9 ACUTE VIRAL SYNDROME: ICD-10-CM

## 2024-11-07 DIAGNOSIS — Z12.11 SCREENING FOR MALIGNANT NEOPLASM OF COLON: ICD-10-CM

## 2024-11-07 DIAGNOSIS — R43.0 LOSS OF SMELL: ICD-10-CM

## 2024-11-07 LAB
SARS-COV-2 AG UPPER RESP QL IA: NEGATIVE
SL AMB POCT RAPID FLU A: NORMAL
SL AMB POCT RAPID FLU B: NORMAL
VALID CONTROL: NORMAL

## 2024-11-07 PROCEDURE — 87811 SARS-COV-2 COVID19 W/OPTIC: CPT | Performed by: INTERNAL MEDICINE

## 2024-11-07 PROCEDURE — 87804 INFLUENZA ASSAY W/OPTIC: CPT | Performed by: INTERNAL MEDICINE

## 2024-11-07 PROCEDURE — 99214 OFFICE O/P EST MOD 30 MIN: CPT | Performed by: INTERNAL MEDICINE

## 2024-11-07 PROCEDURE — G2211 COMPLEX E/M VISIT ADD ON: HCPCS | Performed by: INTERNAL MEDICINE

## 2024-11-07 RX ORDER — CEFDINIR 300 MG/1
300 CAPSULE ORAL EVERY 12 HOURS SCHEDULED
Qty: 20 CAPSULE | Refills: 0 | Status: SHIPPED | OUTPATIENT
Start: 2024-11-07 | End: 2024-11-17

## 2024-11-07 NOTE — PROGRESS NOTES
Ambulatory Visit  Name: Thao Ford      : 1955      MRN: [id number]  Encounter Provider: Sharyn Ly DO  Encounter Date: 2024   Encounter department: [facility]    Assessment & Plan  URTI (acute upper respiratory infection)  - likely viral with bacterial superinfection vs bacterial  - we will start pt on cefdinir 300 mg twice daily for 10, Flonase nasal spray for rhinitis and Mucinex for productive cough  - Pt was counseled to use OTC tylenol or ibuprofen with meals for aches and pains, warm salt water gargles for sore throat and was encouraged to drink lots of fluids, get plenty of rest and wash her hands liberally.  - pt was also counseled to take antibiotics with food and also to use a probiotic like yogurt daily as long as she is taking antibiotics  - She should return to the clinic if her symptoms worsen or do not improve.    Orders:    cefdinir (OMNICEF) 300 mg capsule; Take 1 capsule (300 mg total) by mouth every 12 (twelve) hours for 10 days    Acute viral syndrome  -Point-of-care rapid COVID 19 and influenza A and B test were done and negative and we will manage pt symptomatically  - she has been counseled to keep very well hydrated, to use OTC tylenol for her aches and pains and warm salt water gargles for any sore throat , Flonase for rhinitis and congestion, mucinex for any productive cough and Robitussin for any dry cough.  - she was also counseled to continue to take a daily multivitamin.  - she should wash her hands liberally with soap and water for at least 20 seconds at a time and wear a face mask when in the presence of any other person as long as she is coughing  - if symptoms persist or worsen pt should call the office or go to the ED especially if she develops worsening SOB, chest pain, fever or an inability to tolerate oral intake         Screening for malignant neoplasm of colon  -Patient states that she she is coming up for her colon  cancer screening  -Will refer her to GI   Orders:    Ambulatory Referral to Gastroenterology; Future    Loss of smell    Orders:    POCT Rapid Covid Ag    POCT rapid flu A and B       History of Present Illness     HPI    Patient presents with complaints of feeling sick for 4 days   She complains of loss of sense of taste and smell, cough which is now productive of yellow phlegm, fatigue, congestion, sore throat, weakness, chills, headache, postnasal drip, palpitations, arthralgias, myalgias and insomnia.  Symptoms started gradually   Went to the  urgent care and was given some treatment but no antibiotics.  Of note, she states that she got the covid vaccine and flu shot at the same time 2 months ago and was sick at that time and lost her sense of smell but recovered and then became sick again.  Has been using mucinex DM and it helped her sleep   No hx of contact   Feels like she is getting better today with regards to some symptoms but also feels like the cough is more in her chest now.    History obtained from : patient  Review of Systems   Constitutional:  Positive for chills and fever (with a t max of 100.5). Negative for activity change, fatigue and unexpected weight change.   HENT:  Positive for congestion (head and chest - improving in the head), postnasal drip and sore throat. Negative for ear pain, rhinorrhea and sinus pressure.    Eyes:  Negative for pain.   Respiratory:  Positive for cough. Negative for choking, chest tightness, shortness of breath and wheezing.    Cardiovascular:  Positive for palpitations. Negative for chest pain and leg swelling.   Gastrointestinal:  Negative for abdominal pain, constipation, diarrhea, nausea and vomiting.   Genitourinary:  Negative for dysuria and hematuria.   Musculoskeletal:  Positive for arthralgias (resolving) and myalgias. Negative for back pain, gait problem, joint swelling and neck stiffness.   Skin:  Negative for pallor and rash.   Neurological:  Positive for  headaches. Negative for dizziness, tremors, seizures, syncope and light-headedness.   Hematological:  Negative for adenopathy.   Psychiatric/Behavioral:  Positive for sleep disturbance. Negative for behavioral problems.      Pertinent Medical History         Medical History Reviewed by provider this encounter:  Tobacco  Allergies  Meds  Problems  Med Hx  Surg Hx  Fam Hx       Past Medical History   Past Medical History:   Diagnosis Date    Allergic     Arthritis 4 years ago    Cancer (HCC) 2000    Had Bone Marrow Transplant May 30,2000 at [facility]    CML (chronic myelocytic leukemia) ([facility]) 2000    Disease of thyroid gland     GERD (gastroesophageal reflux disease)     Herniated lumbar intervertebral disc     L5-S1    History of transfusion     Hypertension     Septicemia ([facility])     during admission for stem cell transplant    Shingl2018?     Past Surgical History:   Procedure Laterality Date    BUNIONECTOMY Left      SECTION      COLONOSCOPY      LIMBAL STEM CELL TRANSPLANT      MA ARTHRP KNE CONDYLE&PLATU MEDIAL&LAT COMPARTMENTS Left 3/12/2024    Procedure: ARTHROPLASTY KNEE TOTAL W ROBOT (same day dc);  Surgeon: Akhil Carvajal MD;  [facility];  Service: Orthopedics     Family History   Problem Relation Age of Onset    Hyperlipidemia Mother             Heart disease Mother             Thyroid disease Mother             Arthritis Mother         Osteoarthritis    Stroke Father             Hyperlipidemia Father             Heart disease Father             Diabetes Father     Heart disease Sister         Aortic valve replacement 2021    Heart disease Brother          2022    Hearing loss Brother         Hearing aides at end of life    Cancer Brother         Small cell Ca /  2018     Current Outpatient Medications on File Prior to Visit   Medication Sig Dispense Refill     ascorbic Acid (VITAMIN C) 500 MG CPCR Take 1 capsule (500 mg total) by mouth daily 60 capsule 0    cholecalciferol (VITAMIN D3) 1,000 units tablet Take 2,000 Units by mouth daily      fluticasone (FLONASE) 50 mcg/act nasal spray 1 spray into each nostril daily 11.1 mL 0    hydrochlorothiazide (HYDRODIURIL) 12.5 mg tablet Take 12.5 mg by mouth daily      levothyroxine 100 mcg tablet Take 100 mcg by mouth daily      TURMERIC PO Take 1 capsule by mouth daily      amoxicillin (AMOXIL) 500 mg capsule TAKE 2 CAPSULES (1,000 MG TOTAL) BY MOUTH 60 MINUTES PRE-PROCEDURE FOR 1 DAY (Patient not taking: Reported on 8/23/2024)      Mounjaro 7.5 MG/0.5ML Inject 7.5 mg under the skin every 7 days (Patient not taking: Reported on 11/7/2024)       No current facility-administered medications on file prior to visit.     Allergies   Allergen Reactions    Gluten Meal - Food Allergy Diarrhea    Bactrim [Sulfamethoxazole-Trimethoprim] Other (See Comments)     Allergy from 23 years ago, doesn't remember reaction    Doxycycline Facial Swelling    Allopurinol Rash     Prior to stem cell transplant. rash    Sulfites - Food Allergy Rash      Current Outpatient Medications on File Prior to Visit   Medication Sig Dispense Refill    ascorbic Acid (VITAMIN C) 500 MG CPCR Take 1 capsule (500 mg total) by mouth daily 60 capsule 0    cholecalciferol (VITAMIN D3) 1,000 units tablet Take 2,000 Units by mouth daily      fluticasone (FLONASE) 50 mcg/act nasal spray 1 spray into each nostril daily 11.1 mL 0    hydrochlorothiazide (HYDRODIURIL) 12.5 mg tablet Take 12.5 mg by mouth daily      levothyroxine 100 mcg tablet Take 100 mcg by mouth daily      TURMERIC PO Take 1 capsule by mouth daily      amoxicillin (AMOXIL) 500 mg capsule TAKE 2 CAPSULES (1,000 MG TOTAL) BY MOUTH 60 MINUTES PRE-PROCEDURE FOR 1 DAY (Patient not taking: Reported on 8/23/2024)      Mounjaro 7.5 MG/0.5ML Inject 7.5 mg under the skin every 7 days (Patient not taking: Reported on  "11/7/2024)       No current facility-administered medications on file prior to visit.      Social History     Tobacco Use    Smoking status: Former     Current packs/day: 1.00     Average packs/day: 1 pack/day for 50.8 years (50.8 ttl pk-yrs)     Types: Cigarettes     Start date: 1/9/1974     Passive exposure: Never    Smokeless tobacco: Never   Vaping Use    Vaping status: Never Used   Substance and Sexual Activity    Alcohol use: Yes     Comment: Occasionally 1 glass of wine    Drug use: Never    Sexual activity: Not Currently         Objective     /66 (BP Location: Left arm, Patient Position: Sitting, Cuff Size: Standard)   Pulse 79   Temp 98.6 °F (37 °C) (Temporal)   Ht 5' 4\" (1.626 m)   Wt 73.5 kg (162 lb)   SpO2 97%   BMI 27.81 kg/m²     Physical Exam  Constitutional:       General: She is not in acute distress.     Appearance: She is well-developed. She is not diaphoretic.      Comments: Warm to touch    HENT:      Head: Normocephalic and atraumatic.      Right Ear: External ear normal. Tympanic membrane is injected.      Left Ear: External ear normal.      Nose: Mucosal edema and congestion present.      Mouth/Throat:      Mouth: Mucous membranes are dry.      Pharynx: Posterior oropharyngeal erythema (oropharyngeal erythema with dry mucous membranes) present. No oropharyngeal exudate.   Eyes:      General: No scleral icterus.        Right eye: No discharge.         Left eye: No discharge.      Conjunctiva/sclera: Conjunctivae normal.      Pupils: Pupils are equal, round, and reactive to light.   Neck:      Thyroid: No thyromegaly.      Vascular: No JVD.      Trachea: No tracheal deviation.   Cardiovascular:      Rate and Rhythm: Normal rate and regular rhythm.      Heart sounds: Normal heart sounds. No murmur heard.     No friction rub. No gallop.   Pulmonary:      Effort: Pulmonary effort is normal. No respiratory distress.      Breath sounds: Normal breath sounds. No wheezing or rales. "   Chest:      Chest wall: No tenderness.   Abdominal:      General: Bowel sounds are normal. There is no distension.      Palpations: Abdomen is soft. There is no mass.      Tenderness: There is no abdominal tenderness. There is no guarding or rebound.   Musculoskeletal:         General: No tenderness or deformity. Normal range of motion.      Cervical back: Normal range of motion and neck supple.   Lymphadenopathy:      Cervical: No cervical adenopathy.   Skin:     General: Skin is warm and dry.      Coloration: Skin is not pale.      Findings: No erythema or rash.   Neurological:      Mental Status: She is alert and oriented to person, place, and time.      Cranial Nerves: No cranial nerve deficit.      Motor: No abnormal muscle tone.      Coordination: Coordination normal.      Deep Tendon Reflexes: Reflexes are normal and symmetric.   Psychiatric:         Behavior: Behavior normal.          negative...

## 2025-01-13 ENCOUNTER — OFFICE VISIT (OUTPATIENT)
Dept: INTERNAL MEDICINE CLINIC | Age: 70
End: 2025-01-13
Payer: MEDICARE

## 2025-01-13 VITALS
HEIGHT: 64 IN | BODY MASS INDEX: 27.49 KG/M2 | WEIGHT: 161 LBS | HEART RATE: 70 BPM | DIASTOLIC BLOOD PRESSURE: 70 MMHG | OXYGEN SATURATION: 98 % | TEMPERATURE: 97.8 F | SYSTOLIC BLOOD PRESSURE: 120 MMHG

## 2025-01-13 DIAGNOSIS — I10 ESSENTIAL HYPERTENSION: ICD-10-CM

## 2025-01-13 DIAGNOSIS — Z94.81 BONE MARROW TRANSPLANT STATUS (HCC): ICD-10-CM

## 2025-01-13 DIAGNOSIS — Z12.11 SCREENING FOR COLORECTAL CANCER: ICD-10-CM

## 2025-01-13 DIAGNOSIS — M85.88 OSTEOPENIA OF OTHER SITE: ICD-10-CM

## 2025-01-13 DIAGNOSIS — Z11.59 NEED FOR HEPATITIS C SCREENING TEST: ICD-10-CM

## 2025-01-13 DIAGNOSIS — Z12.12 SCREENING FOR COLORECTAL CANCER: ICD-10-CM

## 2025-01-13 DIAGNOSIS — C92.10 CML (CHRONIC MYELOCYTIC LEUKEMIA) (HCC): ICD-10-CM

## 2025-01-13 DIAGNOSIS — Z23 ENCOUNTER FOR IMMUNIZATION: ICD-10-CM

## 2025-01-13 DIAGNOSIS — R73.03 PREDIABETES: ICD-10-CM

## 2025-01-13 DIAGNOSIS — Z00.00 MEDICARE ANNUAL WELLNESS VISIT, INITIAL: Primary | ICD-10-CM

## 2025-01-13 DIAGNOSIS — Z12.31 ENCOUNTER FOR SCREENING MAMMOGRAM FOR BREAST CANCER: ICD-10-CM

## 2025-01-13 DIAGNOSIS — Z12.11 COLON CANCER SCREENING: ICD-10-CM

## 2025-01-13 PROBLEM — C95.01 ACUTE LEUKEMIA IN REMISSION (HCC): Status: RESOLVED | Noted: 2024-08-02 | Resolved: 2025-01-13

## 2025-01-13 PROCEDURE — G0438 PPPS, INITIAL VISIT: HCPCS | Performed by: INTERNAL MEDICINE

## 2025-01-13 NOTE — PROGRESS NOTES
Assessment/Plan:    Medicare annual wellness visit, initial  -Medicare annual wellness visit done   - last colonoscopy was done about 6 to 7 years ago and patient reports that it was normal.  She was counseled to check with her colorectal/GI specialist to find out when she will be due.  In the meantime, we will give her a referral to gastroenterology for colonoscopy  -  Lung cancer screen is not indicated because patient quit smoking over 40 years ago and did not smoke long enough.  - DEXA scan is scheduled for 3//25 and we will follow-up with the results.  -last mammogram was done in August 2024.  -She is up-to-date with her COVID vaccines, flu shot, Tdap and reports that she is also up-to-date with pneumococcal vaccine and is not interested in the shingles vaccine or RSV.  -follow-up in 1 year or sooner as needed.  Patient only wants to be seen once a year.  Of note, she sees endocrinology and cardiology.    Primary hypertension   - blood pressure is well controlled   -continue with hydrochlorothiazide.  She states that she would like to quit taking the hydrochlorothiazide and was counseled to monitor her blood pressure when she stops the hydrochlorothiazide.  -continue with a low-salt diet and with exercise    Prediabetes  -Stable, last hemoglobin A1c was 5.9  - continue with Mounjaro and continue to follow with endocrinology.    CML  - well controlled status post bone marrow transplant  -Will continue to monitor patient clinically     Diagnoses and all orders for this visit:    Medicare annual wellness visit, initial    Essential hypertension    CML (chronic myelocytic leukemia) (HCC)    Prediabetes    Colon cancer screening  -     Ambulatory Referral to Gastroenterology; Future    Bone marrow transplant status (HCC)    Need for hepatitis C screening test  -     Hepatitis C antibody; Future    Encounter for screening mammogram for breast cancer  Comments:  last mammogram was in August 2024 and she will be due in  August 2025    Screening for colorectal cancer  Comments:  last colonoscopy was about 5 years ago per pt and was normal and she was told to return in about 10 years. No family hx of colon ca    Osteopenia of other site  Comments:  last dexa scan showed osteopenia and she is scheduled to have repeat dexa scan on 3/4/25. takes vit d and does not take ca cos she has aortic arch calcification    Encounter for immunization  Comments:  she is up to date with all her covid shots and her flu shot, tdap, does not want the shingles or RSV shot. thinks she has had her pneumococcal vacc          Subjective:      Patient ID: Thao Ford is a 69 y.o. female.    HPI    Patient presents for an initial medicare annual wellness visit..  She states that she has never had a Medicare wellness visit.  She has a healthcare poa - has a will - has one  and one son  Used to live in [place]  Sees endocrinology for her hypothyroidism - will see him on March 17th- sees him every 6 months - ordered lipid panel, cmp, tsh, vit d and cbc and hemoglobin A 1c  Had bone marrow transplant 25 years ago with resolution of CML.  She has osteopenia and only takes vitamin D because of calcification of her aortic arch.  She states that she still has occasional joint pain but does not want to have any more surgeries.  She is status post replacement of her left knee joint and reports that she did not have a good experience.  Only smoked for 8 years  - quit 41 years ago  Has never been screened for hepatitis C-    She quit smoking about 41 years ago   Last hemoglobin A 1 C was 5.9 and she is on Mounjaro.  She follows with endocrinology.  Sees a cardiologist as well  She admits to fatigue, occasional arthralgias but denies any  fever, chills, night sweats, headache, dizziness, nasal congestion, runny nose, pnd, sore throat, ear ache, sinus pain or pressure, wheezing, cough, chest pain, sob, palpitations, nausea, vomiting, diarrhea, constipation,  hematochezia, hematuria, melena stools, abdominal pain myalgias.        The following portions of the patient's history were reviewed and updated as appropriate: She  has a past medical history of Allergic, Arthritis (4 years ago), Cancer ([facility]) (2000), CML (chronic myelocytic leukemia) ([facility]) (2000), Disease of thyroid gland, GERD (gastroesophageal reflux disease), Herniated lumbar intervertebral disc, History of transfusion, Hypertension, Septicemia (HCC), and Shingles (2018?).  She   Patient Active Problem List    Diagnosis Date Noted   • Establishing care with new doctor, encounter for 2024   • Prediabetes 2024   • Status post total knee replacement, left 2024   • Hypothyroidism due to Hashimoto's thyroiditis 2024   • Essential hypertension 2024   • Primary osteoarthritis of left knee 2024   • Aortic valve sclerosis 2017   • Bone marrow transplant status ([facility]) 2017   • CML (chronic myelocytic leukemia) ([facility]) 2017     She  has a past surgical history that includes  section; Bunionectomy (Left, ); Colonoscopy; Limbal stem cell transplant (); and pr arthrp kne condyle&platu medial&lat compartments (Left, 3/12/2024).  Her family history includes Arthritis in her mother; Cancer in her brother; Diabetes in her father; Hearing loss in her brother; Heart disease in her brother, father, mother, and sister; Hyperlipidemia in her father and mother; Stroke in her father; Thyroid disease in her mother.  She  reports that she has quit smoking. Her smoking use included cigarettes. She started smoking about 51 years ago. She has a 51 pack-year smoking history. She has never been exposed to tobacco smoke. She has never used smokeless tobacco. She reports current alcohol use. She reports that she does not use drugs.  Current Outpatient Medications   Medication Sig Dispense Refill   • amoxicillin (AMOXIL) 500 mg capsule      • ascorbic Acid (VITAMIN C)  500 MG CPCR Take 1 capsule (500 mg total) by mouth daily 60 capsule 0   • cholecalciferol (VITAMIN D3) 1,000 units tablet Take 2,000 Units by mouth daily     • hydrochlorothiazide (HYDRODIURIL) 12.5 mg tablet Take 12.5 mg by mouth daily     • levothyroxine 100 mcg tablet Take 100 mcg by mouth daily     • Mounjaro 7.5 MG/0.5ML Inject 7.5 mg under the skin every 7 days     • TURMERIC PO Take 1 capsule by mouth daily       No current facility-administered medications for this visit.     Current Outpatient Medications on File Prior to Visit   Medication Sig   • amoxicillin (AMOXIL) 500 mg capsule    • ascorbic Acid (VITAMIN C) 500 MG CPCR Take 1 capsule (500 mg total) by mouth daily   • cholecalciferol (VITAMIN D3) 1,000 units tablet Take 2,000 Units by mouth daily   • hydrochlorothiazide (HYDRODIURIL) 12.5 mg tablet Take 12.5 mg by mouth daily   • levothyroxine 100 mcg tablet Take 100 mcg by mouth daily   • Mounjaro 7.5 MG/0.5ML Inject 7.5 mg under the skin every 7 days   • TURMERIC PO Take 1 capsule by mouth daily   • [DISCONTINUED] fluticasone (FLONASE) 50 mcg/act nasal spray 1 spray into each nostril daily     No current facility-administered medications on file prior to visit.     She is allergic to gluten meal - food allergy, bactrim [sulfamethoxazole-trimethoprim], doxycycline, allopurinol, and sulfites - food allergy..    Review of Systems   Constitutional:  Positive for fatigue. Negative for activity change, chills, fever and unexpected weight change.   HENT:  Negative for ear pain, postnasal drip, rhinorrhea, sinus pressure and sore throat.    Eyes:  Negative for pain.   Respiratory:  Negative for cough, choking, chest tightness, shortness of breath and wheezing.    Cardiovascular:  Negative for chest pain, palpitations and leg swelling (occasional leg swelling when she does not take the hctz).   Gastrointestinal:  Negative for abdominal pain, constipation, diarrhea, nausea and vomiting.   Genitourinary:   "Negative for dysuria and hematuria.   Musculoskeletal:  Positive for arthralgias. Negative for back pain, gait problem, joint swelling, myalgias and neck stiffness.   Skin:  Negative for pallor and rash.   Neurological:  Negative for dizziness, tremors, seizures, syncope, light-headedness and headaches.   Hematological:  Negative for adenopathy.   Psychiatric/Behavioral:  Negative for behavioral problems.          Objective:      /70 (BP Location: Left arm, Patient Position: Sitting, Cuff Size: Standard)   Pulse 70   Temp 97.8 °F (36.6 °C) (Temporal)   Ht 5' 4\" (1.626 m)   Wt 73 kg (161 lb)   SpO2 98%   BMI 27.64 kg/m²          Physical Exam  Constitutional:       General: She is not in acute distress.     Appearance: She is well-developed. She is not diaphoretic.   HENT:      Head: Normocephalic and atraumatic.      Right Ear: External ear normal.      Left Ear: External ear normal.      Nose: Nose normal.      Mouth/Throat:      Mouth: Mucous membranes are dry.      Pharynx: Posterior oropharyngeal erythema present. No oropharyngeal exudate.   Eyes:      General: No scleral icterus.        Right eye: No discharge.         Left eye: No discharge.      Conjunctiva/sclera: Conjunctivae normal.      Pupils: Pupils are equal, round, and reactive to light.   Neck:      Thyroid: No thyromegaly.      Vascular: No JVD.      Trachea: No tracheal deviation.   Cardiovascular:      Rate and Rhythm: Normal rate and regular rhythm.      Heart sounds: Normal heart sounds. No murmur heard.     No friction rub. No gallop.   Pulmonary:      Effort: Pulmonary effort is normal. No respiratory distress.      Breath sounds: Normal breath sounds. No wheezing or rales.   Chest:      Chest wall: No tenderness.   Abdominal:      General: Bowel sounds are normal. There is no distension.      Palpations: Abdomen is soft. There is no mass.      Tenderness: There is no abdominal tenderness. There is no guarding or rebound. "   Musculoskeletal:         General: No tenderness or deformity. Normal range of motion.      Cervical back: Normal range of motion and neck supple.   Lymphadenopathy:      Cervical: No cervical adenopathy.   Skin:     General: Skin is warm and dry.      Coloration: Skin is not pale.      Findings: No erythema or rash.   Neurological:      Mental Status: She is alert and oriented to person, place, and time.      Cranial Nerves: No cranial nerve deficit.      Motor: No abnormal muscle tone.      Coordination: Coordination normal.      Deep Tendon Reflexes: Reflexes are normal and symmetric.   Psychiatric:         Behavior: Behavior normal.           Office Visit on 11/07/2024   Component Date Value Ref Range Status   • POCT SARS-CoV-2 Ag 11/07/2024 Negative  Negative Final   • VALID CONTROL 11/07/2024 Valid   Final   • RAPID FLU A 11/07/2024 neg   Final   • RAPID FLU B 11/07/2024 neg   Final   Office Visit on 11/04/2024   Component Date Value Ref Range Status   • POCT SARS-CoV-2 Ag 11/04/2024 Negative  Negative Final   • VALID CONTROL 11/04/2024 Valid   Final   Orders Only on 07/16/2024   Component Date Value Ref Range Status   • Hemoglobin A1C 07/16/2024 5.9   Final   Lab Requisition on 03/04/2024   Component Date Value Ref Range Status   • ABO Grouping 03/04/2024 A   Final   • Rh Factor 03/04/2024 Positive   Final   • Antibody Screen 03/04/2024 Negative   Final   • Specimen Expiration Date 03/04/2024 [id number]   Final   Appointment on 02/20/2024   Component Date Value Ref Range Status   • Sodium 02/20/2024 137  135 - 147 mmol/L Final   • Potassium 02/20/2024 4.9  3.5 - 5.3 mmol/L Final   • Chloride 02/20/2024 101  96 - 108 mmol/L Final   • CO2 02/20/2024 26  21 - 32 mmol/L Final   • ANION GAP 02/20/2024 10  mmol/L Final   • BUN 02/20/2024 16  5 - 25 mg/dL Final   • Creatinine 02/20/2024 0.84  0.60 - 1.30 mg/dL Final    Standardized to IDMS reference method   • Glucose, Fasting 02/20/2024 92  65 - 99 mg/dL Final   •  Calcium 02/20/2024 10.2  8.4 - 10.2 mg/dL Final   • AST 02/20/2024 18  13 - 39 U/L Final   • ALT 02/20/2024 21  7 - 52 U/L Final    Specimen collection should occur prior to Sulfasalazine administration due to the potential for falsely depressed results.    • Alkaline Phosphatase 02/20/2024 68  34 - 104 U/L Final   • Total Protein 02/20/2024 7.2  6.4 - 8.4 g/dL Final   • Albumin 02/20/2024 4.3  3.5 - 5.0 g/dL Final   • Total Bilirubin 02/20/2024 0.71  0.20 - 1.00 mg/dL Final    Use of this assay is not recommended for patients undergoing treatment with eltrombopag due to the potential for falsely elevated results.  N-acetyl-p-benzoquinone imine (metabolite of Acetaminophen) will generate erroneously low results in samples for patients that have taken an overdose of Acetaminophen.   • eGFR 02/20/2024 71  ml/min/1.73sq m Final   • WBC 02/20/2024 6.54  4.31 - 10.16 Thousand/uL Final   • RBC 02/20/2024 4.30  3.81 - 5.12 Million/uL Final   • Hemoglobin 02/20/2024 13.8  11.5 - 15.4 g/dL Final   • Hematocrit 02/20/2024 41.5  34.8 - 46.1 % Final   • MCV 02/20/2024 97  82 - 98 fL Final   • MCH 02/20/2024 32.1  26.8 - 34.3 pg Final   • MCHC 02/20/2024 33.3  31.4 - 37.4 g/dL Final   • RDW 02/20/2024 13.5  11.6 - 15.1 % Final   • MPV 02/20/2024 9.9  8.9 - 12.7 fL Final   • Platelets 02/20/2024 297  149 - 390 Thousands/uL Final   • nRBC 02/20/2024 0  /100 WBCs Final   • Segmented % 02/20/2024 52  43 - 75 % Final   • Immature Grans % 02/20/2024 0  0 - 2 % Final   • Lymphocytes % 02/20/2024 38  14 - 44 % Final   • Monocytes % 02/20/2024 8  4 - 12 % Final   • Eosinophils Relative 02/20/2024 1  0 - 6 % Final   • Basophils Relative 02/20/2024 1  0 - 1 % Final   • Absolute Neutrophils 02/20/2024 3.44  1.85 - 7.62 Thousands/µL Final   • Absolute Immature Grans 02/20/2024 0.01  0.00 - 0.20 Thousand/uL Final   • Absolute Lymphocytes 02/20/2024 2.46  0.60 - 4.47 Thousands/µL Final   • Absolute Monocytes 02/20/2024 0.51  0.17 - 1.22  Thousand/µL Final   • Eosinophils Absolute 02/20/2024 0.08  0.00 - 0.61 Thousand/µL Final   • Basophils Absolute 02/20/2024 0.04  0.00 - 0.10 Thousands/µL Final   • Protime 02/20/2024 12.6  11.6 - 14.5 seconds Final   • INR 02/20/2024 0.95  0.84 - 1.19 Final   • PTT 02/20/2024 24  23 - 37 seconds Final    Therapeutic Heparin Range =  60-90 seconds   • Hemoglobin A1C 02/20/2024 5.6  Normal 4.0-5.6%; PreDiabetic 5.7-6.4%; Diabetic >=6.5%; Glycemic control for adults with diabetes <7.0% % Final   • EAG 02/20/2024 114  mg/dl Final   Telephone on 02/07/2024   Component Date Value Ref Range Status   • Supplier Name 02/07/2024 [employer]   Final-Edited   • Supplier Phone Number 02/07/2024 [phone]   Final-Edited   • Order Status 02/07/2024 Delivery Successful   Final-Edited   • Delivery Request Date 02/07/2024 02/07/2024   Final-Edited   • Date Delivered  02/07/2024 02/12/2024   Final-Edited   • Item Description 02/07/2024 Wheeled Walker, Adult   Final-Edited    Qty: 1       Answers submitted by the patient for this visit:  Medicare Annual Wellness Visit (Submitted on 1/6/2025)  How would you rate your overall health?: very good  Compared to last year, how is your physical health?: slightly better  In general, how satisfied are you with your life?: very satisfied  Compared to last year, how is your eyesight?: same  Compared to last year, how is your hearing?: same  Compared to last year, how is your emotional/mental health?: slightly better  How often is anger a problem for you?: never, rarely  How often do you feel unusually tired/fatigued?: sometimes  In the past 7 days, how much pain have you experienced?: none  In the past 6 months, have you lost or gained 10 pounds without trying?: No  One or more falls in the last year: Yes  In the past 6 months, have you accidentally leaked urine?: No  Do you have trouble with the stairs inside or outside your home?: No  Does your home have working  smoke alarms?: Yes  Does your home have a carbon monoxide monitor?: Yes  Which safety hazards (if any) have you experienced in your home? Please select all that apply.: none  How would you describe your current diet? Please select all that apply.: Regular, Low Carb, Limited junk food  In addition to prescription medications, are you taking any over-the-counter supplements?: Yes  If yes, what supplements are you taking?: Vitamin D,C,Turmeric  Can you manage your medications?: Yes  Are you currently taking any opioid medications?: No  Can you walk and transfer into and out of your bed and chair?: Yes  Can you dress and groom yourself?: Yes  Can you bathe or shower yourself?: Yes  Can you feed yourself?: Yes  Can you do your laundry/ housekeeping?: Yes  Can you manage your money, pay your bills, and track your expenses?: Yes  Can you make your own meals?: Yes  Can you do your own shopping?: Yes  Within the last 12 months, have you had any hospitalizations or Emergency Department visits?: Yes  If yes, how many times have you been hospitalized within the past year?: 1-2  Do you have a living will?: Yes  Do you have a Durable POA (Power of ) for healthcare decisions?: Yes  Do you have an Advanced Directive for end of life decisions?: Yes  How often have you used an illegal drug (including marijuana) or a prescription medication for non-medical reasons in the past year?: never  What is the typical number of drinks you consume in a day?: 0  What is the typical number of drinks you consume in a week?: 1  How often did you have a drink containing alcohol in the past year?: monthly or less  How many drinks did you have on a typical day  when you were drinking in the past year?: 0  How often did you have 6 or more drinks on one occasion in the past year?: never

## 2025-01-13 NOTE — PROGRESS NOTES
Name: Thao Ford      : 1955      MRN: [id number]  Encounter Provider: Sharyn Ly DO  Encounter Date: 2025   Encounter department: [facility]    Assessment & Plan       Preventive health issues were discussed with patient, and age appropriate screening tests were ordered as noted in patient's After Visit Summary. Personalized health advice and appropriate referrals for health education or preventive services given if needed, as noted in patient's After Visit Summary.    History of Present Illness     HPI   Patient Care Team:  Rut Yeh DO as PCP - General (Family Medicine)    Review of Systems  Medical History Reviewed by provider this encounter:       Annual Wellness Visit Questionnaire   Thao is here for her Initial Wellness visit.     Health Risk Assessment:   Patient rates overall health as very good. Patient feels that their physical health rating is slightly better. Patient is very satisfied with their life. Eyesight was rated as same. Hearing was rated as same. Patient feels that their emotional and mental health rating is slightly better. Patients states they are never, rarely angry. Patient states they are sometimes unusually tired/fatigued. Pain experienced in the last 7 days has been none. Patient states that she has experienced no weight loss or gain in last 6 months.     Fall Risk Screening:   In the past year, patient has experienced: history of falling in past year      Urinary Incontinence Screening:   Patient has not leaked urine accidently in the last six months.     Home Safety:  Patient does not have trouble with stairs inside or outside of their home. Patient has working smoke alarms and has working carbon monoxide detector. Home safety hazards include: none.     Nutrition:   Current diet is Regular, Low Carb and Limited junk food.     Medications:   Patient is currently taking over-the-counter supplements. OTC medications  include: see medication list. Patient is able to manage medications.     Activities of Daily Living (ADLs)/Instrumental Activities of Daily Living (IADLs):   Walk and transfer into and out of bed and chair?: Yes  Dress and groom yourself?: Yes    Bathe or shower yourself?: Yes    Feed yourself? Yes  Do your laundry/housekeeping?: Yes  Manage your money, pay your bills and track your expenses?: Yes  Make your own meals?: Yes    Do your own shopping?: Yes    Previous Hospitalizations:   Any hospitalizations or ED visits within the last 12 months?: Yes    How many hospitalizations have you had in the last year?: 1-2    Advance Care Planning:   Living will: Yes    Durable POA for healthcare: Yes    Advanced directive: Yes      PREVENTIVE SCREENINGS        Diabetes Screening:     General: Screening Current      Breast Cancer Screening:     General: Screening Current      Cervical Cancer Screening:    General: Screening Not Indicated    Screening, Brief Intervention, and Referral to Treatment (SBIRT)    Screening  Typical number of drinks in a day: 0  Typical number of drinks in a week: 1  Interpretation: Low risk drinking behavior.    AUDIT-C Screenin) How often did you have a drink containing alcohol in the past year? monthly or less  2) How many drinks did you have on a typical day when you were drinking in the past year? 0  3) How often did you have 6 or more drinks on one occasion in the past year? never    AUDIT-C Score: 1  Interpretation: Score 0-2 (female): Negative screen for alcohol misuse    Single Item Drug Screening:  How often have you used an illegal drug (including marijuana) or a prescription medication for non-medical reasons in the past year? never    Single Item Drug Screen Score: 0  Interpretation: Negative screen for possible drug use disorder    Social Drivers of Health     Food Insecurity: No Food Insecurity (2025)    Hunger Vital Sign     Worried About Running Out of Food in the Last  Year: Never true     Ran Out of Food in the Last Year: Never true   Transportation Needs: No Transportation Needs (1/6/2025)    PRAPARE - Transportation     Lack of Transportation (Medical): No     Lack of Transportation (Non-Medical): No   Housing Stability: Low Risk  (1/6/2025)    Housing Stability Vital Sign     Unable to Pay for Housing in the Last Year: No     Number of Times Moved in the Last Year: 0     Homeless in the Last Year: No   Utilities: Not At Risk (1/6/2025)    [facility] Utilities     Threatened with loss of utilities: No     No results found.    Objective   There were no vitals taken for this visit.    Physical Exam

## 2025-01-13 NOTE — PATIENT INSTRUCTIONS
Medicare Preventive Visit Patient Instructions  Thank you for completing your Welcome to Medicare Visit or Medicare Annual Wellness Visit today. Your next wellness visit will be due in one year (1/14/2026).  The screening/preventive services that you may require over the next 5-10 years are detailed below. Some tests may not apply to you based off risk factors and/or age. Screening tests ordered at today's visit but not completed yet may show as past due. Also, please note that scanned in results may not display below.  Preventive Screenings:  Service Recommendations Previous Testing/Comments   Colorectal Cancer Screening  * Colonoscopy    * Fecal Occult Blood Test (FOBT)/Fecal Immunochemical Test (FIT)  * Fecal DNA/Cologuard Test  * Flexible Sigmoidoscopy Age: 45-75 years old   Colonoscopy: every 10 years (may be performed more frequently if at higher risk)  OR  FOBT/FIT: every 1 year  OR  Cologuard: every 3 years  OR  Sigmoidoscopy: every 5 years  Screening may be recommended earlier than age 45 if at higher risk for colorectal cancer. Also, an individualized decision between you and your healthcare provider will decide whether screening between the ages of 76-85 would be appropriate. Colonoscopy: Not on file  FOBT/FIT: Not on file  Cologuard: Not on file  Sigmoidoscopy: Not on file          Breast Cancer Screening Age: 40+ years old  Frequency: every 1-2 years  Not required if history of left and right mastectomy Mammogram: 08/14/2024    Screening Current   Cervical Cancer Screening Between the ages of 21-29, pap smear recommended once every 3 years.   Between the ages of 30-65, can perform pap smear with HPV co-testing every 5 years.   Recommendations may differ for women with a history of total hysterectomy, cervical cancer, or abnormal pap smears in past. Pap Smear: Not on file    Screening Not Indicated   Hepatitis C Screening Once for adults born between 1945 and 1965  More frequently in patients at high  risk for Hepatitis C Hep C Antibody: Not on file        Diabetes Screening 1-2 times per year if you're at risk for diabetes or have pre-diabetes Fasting glucose: 92 mg/dL (2/20/2024)  A1C: 5.9 (7/16/2024)  Screening Current   Cholesterol Screening Once every 5 years if you don't have a lipid disorder. May order more often based on risk factors. Lipid panel: Not on file          Other Preventive Screenings Covered by Medicare:  Abdominal Aortic Aneurysm (AAA) Screening: covered once if your at risk. You're considered to be at risk if you have a family history of AAA.  Lung Cancer Screening: covers low dose CT scan once per year if you meet all of the following conditions: (1) Age 55-77; (2) No signs or symptoms of lung cancer; (3) Current smoker or have quit smoking within the last 15 years; (4) You have a tobacco smoking history of at least 20 pack years (packs per day multiplied by number of years you smoked); (5) You get a written order from a healthcare provider.  Glaucoma Screening: covered annually if you're considered high risk: (1) You have diabetes OR (2) Family history of glaucoma OR (3)  aged 50 and older OR (4)  American aged 65 and older  Osteoporosis Screening: covered every 2 years if you meet one of the following conditions: (1) You're estrogen deficient and at risk for osteoporosis based off medical history and other findings; (2) Have a vertebral abnormality; (3) On glucocorticoid therapy for more than 3 months; (4) Have primary hyperparathyroidism; (5) On osteoporosis medications and need to assess response to drug therapy.   Last bone density test (DXA Scan): 10/23/2024.  HIV Screening: covered annually if you're between the age of 15-65. Also covered annually if you are younger than 15 and older than 65 with risk factors for HIV infection. For pregnant patients, it is covered up to 3 times per pregnancy.    Immunizations:  Immunization Recommendations   Influenza Vaccine  Annual influenza vaccination during flu season is recommended for all persons aged >= 6 months who do not have contraindications   Pneumococcal Vaccine   * Pneumococcal conjugate vaccine = PCV13 (Prevnar 13), PCV15 (Vaxneuvance), PCV20 (Prevnar 20)  * Pneumococcal polysaccharide vaccine = PPSV23 (Pneumovax) Adults 19-65 yo with certain risk factors or if 65+ yo  If never received any pneumonia vaccine: recommend Prevnar 20 (PCV20)  Give PCV20 if previously received 1 dose of PCV13 or PPSV23   Hepatitis B Vaccine 3 dose series if at intermediate or high risk (ex: diabetes, end stage renal disease, liver disease)   Respiratory syncytial virus (RSV) Vaccine - COVERED BY MEDICARE PART D  * RSVPreF3 (Arexvy) CDC recommends that adults 60 years of age and older may receive a single dose of RSV vaccine using shared clinical decision-making (SCDM)   Tetanus (Td) Vaccine - COST NOT COVERED BY MEDICARE PART B Following completion of primary series, a booster dose should be given every 10 years to maintain immunity against tetanus. Td may also be given as tetanus wound prophylaxis.   Tdap Vaccine - COST NOT COVERED BY MEDICARE PART B Recommended at least once for all adults. For pregnant patients, recommended with each pregnancy.   Shingles Vaccine (Shingrix) - COST NOT COVERED BY MEDICARE PART B  2 shot series recommended in those 19 years and older who have or will have weakened immune systems or those 50 years and older     Health Maintenance Due:      Topic Date Due   • Hepatitis C Screening  Never done   • Colorectal Cancer Screening  Never done   • Breast Cancer Screening: Mammogram  08/14/2025     Immunizations Due:      Topic Date Due   • Pneumococcal Vaccine: 65+ Years (1 of 2 - PCV) Never done   • COVID-19 Vaccine (2 - Pfizer risk series) 10/24/2023   • Influenza Vaccine (1) 09/01/2024     Advance Directives   What are advance directives?  Advance directives are legal documents that state your wishes and plans for  medical care. These plans are made ahead of time in case you lose your ability to make decisions for yourself. Advance directives can apply to any medical decision, such as the treatments you want, and if you want to donate organs.   What are the types of advance directives?  There are many types of advance directives, and each state has rules about how to use them. You may choose a combination of any of the following:  Living will:  This is a written record of the treatment you want. You can also choose which treatments you do not want, which to limit, and which to stop at a certain time. This includes surgery, medicine, IV fluid, and tube feedings.   Durable power of  for healthcare (DPAHC):  This is a written record that states who you want to make healthcare choices for you when you are unable to make them for yourself. This person, called a proxy, is usually a family member or a friend. You may choose more than 1 proxy.  Do not resuscitate (DNR) order:  A DNR order is used in case your heart stops beating or you stop breathing. It is a request not to have certain forms of treatment, such as CPR. A DNR order may be included in other types of advance directives.  Medical directive:  This covers the care that you want if you are in a coma, near death, or unable to make decisions for yourself. You can list the treatments you want for each condition. Treatment may include pain medicine, surgery, blood transfusions, dialysis, IV or tube feedings, and a ventilator (breathing machine).  Values history:  This document has questions about your views, beliefs, and how you feel and think about life. This information can help others choose the care that you would choose.  Why are advance directives important?  An advance directive helps you control your care. Although spoken wishes may be used, it is better to have your wishes written down. Spoken wishes can be misunderstood, or not followed. Treatments may be given  even if you do not want them. An advance directive may make it easier for your family to make difficult choices about your care.   Fall Prevention    Fall prevention  includes ways to make your home and other areas safer. It also includes ways you can move more carefully to prevent a fall. Health conditions that cause changes in your blood pressure, vision, or muscle strength and coordination may increase your risk for falls. Medicines may also increase your risk for falls if they make you dizzy, weak, or sleepy.   Fall prevention tips:   Stand or sit up slowly.    Use assistive devices as directed.    Wear shoes that fit well and have soles that .    Wear a personal alarm.    Stay active.    Manage your medical conditions.    Home Safety Tips:  Add items to prevent falls in the bathroom.    Keep paths clear.    Install bright lights in your home.    Keep items you use often on shelves within reach.    Paint or place reflective tape on the edges of your stairs.    Weight Management   Why it is important to manage your weight:  Being overweight increases your risk of health conditions such as heart disease, high blood pressure, type 2 diabetes, and certain types of cancer. It can also increase your risk for osteoarthritis, sleep apnea, and other respiratory problems. Aim for a slow, steady weight loss. Even a small amount of weight loss can lower your risk of health problems.  How to lose weight safely:  A safe and healthy way to lose weight is to eat fewer calories and get regular exercise. You can lose up about 1 pound a week by decreasing the number of calories you eat by 500 calories each day.   Healthy meal plan for weight management:  A healthy meal plan includes a variety of foods, contains fewer calories, and helps you stay healthy. A healthy meal plan includes the following:  Eat whole-grain foods more often.  A healthy meal plan should contain fiber. Fiber is the part of grains, fruits, and vegetables  that is not broken down by your body. Whole-grain foods are healthy and provide extra fiber in your diet. Some examples of whole-grain foods are whole-wheat breads and pastas, oatmeal, brown rice, and bulgur.  Eat a variety of vegetables every day.  Include dark, leafy greens such as spinach, kale, theo greens, and mustard greens. Eat yellow and orange vegetables such as carrots, sweet potatoes, and winter squash.   Eat a variety of fruits every day.  Choose fresh or canned fruit (canned in its own juice or light syrup) instead of juice. Fruit juice has very little or no fiber.  Eat low-fat dairy foods.  Drink fat-free (skim) milk or 1% milk. Eat fat-free yogurt and low-fat cottage cheese. Try low-fat cheeses such as mozzarella and other reduced-fat cheeses.  Choose meat and other protein foods that are low in fat.  Choose beans or other legumes such as split peas or lentils. Choose fish, skinless poultry (chicken or turkey), or lean cuts of red meat (beef or pork). Before you cook meat or poultry, cut off any visible fat.   Use less fat and oil.  Try baking foods instead of frying them. Add less fat, such as margarine, sour cream, regular salad dressing and mayonnaise to foods. Eat fewer high-fat foods. Some examples of high-fat foods include french fries, doughnuts, ice cream, and cakes.  Eat fewer sweets.  Limit foods and drinks that are high in sugar. This includes candy, cookies, regular soda, and sweetened drinks.  Exercise:  Exercise at least 30 minutes per day on most days of the week. Some examples of exercise include walking, biking, dancing, and swimming. You can also fit in more physical activity by taking the stairs instead of the elevator or parking farther away from stores. Ask your healthcare provider about the best exercise plan for you.      © Copyright [employer] 2018 Information is for End User's use only and may not be sold, redistributed or otherwise used for commercial purposes. All  illustrations and images included in CareNotes® are the copyrighted property of [employer] or "[employer]"

## 2025-01-23 ENCOUNTER — TELEPHONE (OUTPATIENT)
Dept: ADMINISTRATIVE | Facility: OTHER | Age: 70
End: 2025-01-23

## 2025-01-23 NOTE — LETTER
Procedure Request Form: Colonoscopy      Date Requested: 25  Patient: Thao Ford  Patient : 1955   Referring Provider: Rut Yeh, DO        Date of Procedure ______________________________       The above patient has informed us that they have completed their   most recent Colonoscopy at your facility. Please complete   this form and attach all corresponding procedure reports/results.    Comments __2015 ________________________________________________________  ____________________________________________________________________  ____________________________________________________________________  ____________________________________________________________________    Facility Completing Procedure _________________________________________    Form Completed By (print name) _______________________________________      Signature __________________________________________________________      These reports are needed for  compliance.    Please fax this completed form and a copy of the procedure report to our office located at [address] [place] [address] as soon as possible to Fax [phone] teena Thomas: Phone [phone]    We thank you for your assistance in treating our mutual patient.

## 2025-01-23 NOTE — TELEPHONE ENCOUNTER
----- Message from Luis A BROOKS sent at 1/23/2025  9:18 AM EST -----  Patient stated that Colonoscopy was done 10 years ago in 2015 With Dr. Arley Oden  Around [facility].      01/23/25 9:19 AM    Hello, our patient Thao Ford has had CRC: Colonoscopy completed/performed. Please assist in updating the patient chart by making an External outreach to Dr Arley Oden facility located in [facility] or [place]. The date of service is 10 years ago.    Thank you,  Luis A Greer  [facility] INTERNAL MED

## 2025-01-31 NOTE — TELEPHONE ENCOUNTER
As a follow-up, a second attempt has been made for outreach via fax to facility. Please see Contacts section for details.    X2 colo    Thank you  Martha Nolan

## 2025-02-07 NOTE — TELEPHONE ENCOUNTER
Upon review of the In Basket request we were able to locate, review, and update the patient chart as requested for CRC: Colonoscopy.    Any additional questions or concerns should be emailed to the Practice Liaisons via the appropriate education email address, please do not reply via In Basket.    Thank you  Martha Nolan   [facility]

## 2025-02-08 DIAGNOSIS — Z12.11 SCREENING FOR COLON CANCER: Primary | ICD-10-CM

## 2025-03-04 ENCOUNTER — HOSPITAL ENCOUNTER (OUTPATIENT)
Dept: RADIOLOGY | Age: 70
Discharge: HOME | End: 2025-03-04
Payer: MEDICARE

## 2025-03-04 VITALS — WEIGHT: 156 LBS | BODY MASS INDEX: 27.64 KG/M2 | HEIGHT: 63 IN

## 2025-03-04 PROCEDURE — 77080 DXA BONE DENSITY AXIAL: CPT

## 2025-03-11 ENCOUNTER — TELEPHONE (OUTPATIENT)
Age: 70
End: 2025-03-11

## 2025-03-11 ENCOUNTER — PREP FOR PROCEDURE (OUTPATIENT)
Dept: GASTROENTEROLOGY | Facility: MEDICAL CENTER | Age: 70
End: 2025-03-11

## 2025-03-11 DIAGNOSIS — Z12.11 SCREENING FOR COLON CANCER: Primary | ICD-10-CM

## 2025-03-11 LAB — HBA1C MFR BLD HPLC: 5.7 %

## 2025-03-11 NOTE — TELEPHONE ENCOUNTER
"03/11/25  Screened by: Xanderuyen Mejiapal    Referring Provider Dr. Ly    Pre- Screening:     There is no height or weight on file to calculate BMI.  H 5' 3.25\" Wt 160 lbs  BMI 28.1    Has patient been referred for a routine screening Colonoscopy? yes  Is the patient between 45-75 years old? yes      Previous Colonoscopy yes   If yes:    Date: 8/27/2015    Facility:     Reason:     Does the patient want to see a Gastroenterologist prior to their procedure OR are they having any GI symptoms? no    Has the patient been hospitalized or had abdominal surgery in the past 6 months? no    Does the patient use supplemental oxygen? no    Does the patient take Coumadin, Lovenox, Plavix, Elliquis, Xarelto, or other blood thinning medication? no    Has the patient had a stroke, cardiac event, or stent placed in the past year? no    If patient is between 45yrs - 49yrs, please advise patient that we will have to confirm benefits & coverage with their insurance company for a routine screening colonoscopy.    "

## 2025-03-11 NOTE — TELEPHONE ENCOUNTER
Scheduled date of colonoscopy (as of today): 5/15/25  Physician performing colonoscopy: Dr. Redman   [facility]  Bowel prep reviewed with patient: Miralax/Dulcolax prep, Diabetic instructions, Screening vs diagnostic sent via my chart in communications under letters.  Instructions reviewed with patient by: Xander  Clearances: N/A    Advised pt to bring insurance ID card and any copayment and to contact insurance co for any insurance or coverage questions

## 2025-03-11 NOTE — LETTER
Attached are your prep instructions for your upcoming procedure on 5/15/25. If you have any questions or concerns please contact us at [phone].    Thank you,     [facility]         COLONOSCOPY  MIRALAX/Dulcolax Bowel Preparation Instructions    The OR/GI Lab will contact you the evening prior to your procedure with your exact arrival time.    Our practice requires a 1 week notice for any cancellations or rescheduling. We kindly ask that you immediately notify us of any changes including any new medications that are prescribed. Thank you for your cooperation.     WEEK BEFORE YOUR PROCEDURE:  Stop taking Iron tablets.  5 days prior, AVOID vegetables and fruits with skins or seeds, nuts, corn, popcorn and whole grain breads.   Purchase: One (1) 238-gram container of Miralax (polyethylene glycol 3350), four (4) 5 mg Dulcolax (bisacodyl) tablets, and one (1) 64-ounce bottle of Gatorade (sports drink) - no red, orange, or purple. These may be purchased at any pharmacy without a prescription. Generic products are permissible.   Arrange responsible transportation for day of the procedure.     DAY BEFORE THE PROCEDURE:   CLEAR liquids only for entire day prior. Nothing red, orange or purple.    You MAY have:                                                               Soda  Water  Broth Gatorade  Jello  Popsicles Coffee/tea without milk/creamer     YOU MAY NOT HAVE:  Solid foods   Milk and milk products    Juice with pulp    BOWEL PREPARATION:  Includes: One (1) 238-gram container of Miralax (polyethylene glycol 3350), four (4) 5 mg Dulcolax (bisacodyl) tablets, and one (1) 64-ounce bottle of Gatorade (sports drink).  Preparation may be refrigerated.  Entire bowel prep should be completed.     Afternoon before the procedure (2:00 pm - 5:00 pm):    Take two (2) 5 mg Dulcolax laxative tablets.     Evening before the procedure (6:00 pm):  Mix entire container of  Miralax with one (1) 64-ounce bottle of Gatorade and shake until all medication is dissolved.   Begin drinking solution. Drink an eight (8) ounce cup every 10-15 minutes until you have consumed half (32 ounces) of the solution.  Refrigerate remaining solution.    Night before the procedure (8:00 pm):  Take two (2) 5 mg Dulcolax laxative tablets.     Beginning 5 hours before your procedure:  Drink the remaining amount of prepared solution (32 ounces).  Drink an eight (8) ounce cup every 10-15 minutes until you have consumed the remaining solution.     Bowel prep should be completed 4 hours prior to procedure time.    NOTHING TO EAT OR DRINK AFTER MIDNIGHT- EXCEPT FOR YOUR PREP    DAY OF THE PROCEDURE:  You may brush your teeth.  Leave all jewelry at home.  Please arrive for your procedure as indicated by the OR / GI Lab / Endoscopy Unit. The hospital will contact you the day before with your exact arrival time.   Make sure you have arranged ahead of time for a responsible adult (18 or older) to accompany and drive you home after the procedure.  Please discuss any transportation concerns with our staff prior to your procedure.    The effects of the anesthesia can persist for 24 hours.  After receiving the sedation, you must exercise caution before engaging in any activity that could harm yourself and others (such as driving a car).  Do not make any important decisions or do not drink any alcoholic beverages during this time period.  After your procedure, you may have anything you'd like to eat or drink.  You will probably want to start with something light.  Please include plenty of fluids.  Avoid items that cause gas such as sodas and salads.    SPECIAL INSTRUCTIONS:    For patients currently taking blood thinners and/or antiplatelet therapy our office will contact the prescribing provider.  Our office will contact you with any required changes to your medication regimen.     Blood thinner (i.e. - Coumadin, Pradaxa,  Lovenox, Xarelto, Eliquis)  ?  Continue (Do Not Stop)  ? Stop______________for_____________days prior to the procedure.    Antiplatelet (i.e. - Plavix, Aggrenox, Effient, Brilinta)  ?  Continue (Do Not Stop)  ? Stop______________for_____________days prior to the procedure.       Diabetes:   If you are Diabetic, please see separate Diabetic Instruction Sheet.          Prescribed medications:  Do not stop your aspirin, or any of your other medications (unless instructed otherwise).    Take the rest of your prescribed medications with small sips of water at least 2 hours prior to your procedure.      For any questions or concerns related to your bowel preparation or pre-procedure instructions, please contact our office at [phone].  Thank you for choosing [facility]!

## 2025-03-12 LAB — HCV AB S/CO SERPL IA: NON REACTIVE

## 2025-03-19 ENCOUNTER — APPOINTMENT (OUTPATIENT)
Dept: RADIOLOGY | Facility: OTHER | Age: 70
End: 2025-03-19
Payer: MEDICARE

## 2025-03-19 ENCOUNTER — OFFICE VISIT (OUTPATIENT)
Dept: OBGYN CLINIC | Facility: OTHER | Age: 70
End: 2025-03-19
Payer: MEDICARE

## 2025-03-19 VITALS — BODY MASS INDEX: 26.63 KG/M2 | HEIGHT: 64 IN | WEIGHT: 156 LBS

## 2025-03-19 DIAGNOSIS — Z96.652 STATUS POST TOTAL KNEE REPLACEMENT, LEFT: ICD-10-CM

## 2025-03-19 DIAGNOSIS — Z96.652 STATUS POST TOTAL KNEE REPLACEMENT, LEFT: Primary | ICD-10-CM

## 2025-03-19 PROCEDURE — 73562 X-RAY EXAM OF KNEE 3: CPT

## 2025-03-19 PROCEDURE — 99213 OFFICE O/P EST LOW 20 MIN: CPT | Performed by: ORTHOPAEDIC SURGERY

## 2025-03-19 RX ORDER — SEMAGLUTIDE 2.68 MG/ML
INJECTION, SOLUTION SUBCUTANEOUS
COMMUNITY
Start: 2025-03-13

## 2025-03-19 NOTE — PROGRESS NOTES
"Orthopaedic Surgery - Office Note  Thao Ford (69 y.o. female)   : 1955   MRN: [id number]  Encounter Date: 3/19/2025    Assessment / Plan  Status post left total knee arthroplasty with robotic assistance on 3/12/2024  Status post fall 2024 on the anterior aspect of the left knee which was closed in the emergency room  No x-rays were taken and explained to the patient at today's visit.  Continue with ADLs as tolerated.  Explained that the sensation of \"longer left leg\" may be related to her past medical history of hip dysplasia, or the slight correction from her varus deformity.  Explained that she can follow-up as needed.  Follow-up:  Return if symptoms worsen or fail to improve.      Chief Complaint / Date of Onset  S/p left total knee - 3/12/2024  Injury Mechanism / Date  New injury - Fall on 2024  Surgery / Date  None    History of Present Illness   Thao Ford is a 69 y.o. female who presents for 1 year follow-up of the above listed procedure.  She states she is doing extremely well at this time.  She does state that she feels like her left leg is longer than her right leg which she believes causes her to walk incorrectly.  She denies any new injury or trauma to the knee.  She denies any numbness tingling at this time.    Treatment Summary  Medications / Modalities  Tylenol 500mg at night prn  Bracing / Immobilization  None  Physical Therapy  None  Injections  None  Prior Surgeries  None  Other Treatments  None     Employment / Current Status  Retired     Sport / Organization / Current Status  Gardening      Review of Systems  Pertinent items are noted in HPI.  All other systems were reviewed and are negative.      Physical Exam  Ht 5' 3.5\" (1.613 m)   Wt 70.8 kg (156 lb)   BMI 27.20 kg/m²   Cons: Appears well.  No apparent distress.  Psych: Alert. Oriented x3.  Mood and affect normal.  Eyes: PERRLA, EOMI  Resp: Normal effort.  No audible wheezing or stridor.  CV: Palpable pulse.  " No discernable arrhythmia.  No LE edema.  Lymph:  No palpable cervical, axillary, or inguinal lymphadenopathy.  Skin: Warm.  No palpable masses.  No visible lesions.  Neuro: Normal muscle tone.  Normal and symmetric DTR's.     Left Knee Exam  Alignment:  Normal knee alignment.  Inspection:  No swelling. No edema. No erythema. No ecchymosis.  Palpation:  No tenderness. No effusion.  ROM:  Knee Extension -5. Knee Flexion 125.  Strength:  Not tested.  Stability:  No objective knee instability. Stable Varus / Valgus stress, Lachman, and Posterior drawer.  Tests:  No pertinent positive or negative tests.  Patella:  Not tested.  Neurovascular:  Sensation intact in DP/SP/Montoya/Sa/T nerve distributions. Sensation intact L1-S1 BLE.  2+ DP & PT pulses.  Gait:  Not tested.       Studies Reviewed  XR of left knee - femoral and tibial implants are in good position and alignment with no signs of loosening or failure.  No signs of acute osseous abnormalities.      Procedures  No procedures today.    Medical, Surgical, Family, and Social History  The patient's medical history, family history, and social history, were reviewed and updated as appropriate.    Past Medical History:   Diagnosis Date    Allergic     Arthritis 4 years ago    Cancer (HCC) 2000    Had Bone Marrow Transplant May 30,2000 at [facility]    CML (chronic myelocytic leukemia) ([facility]) 2000    Disease of thyroid gland     GERD (gastroesophageal reflux disease)     Herniated lumbar intervertebral disc     L5-S1    History of transfusion     Hypertension     Septicemia ([facility])     during admission for stem cell transplant    Shingles 2018?       Past Surgical History:   Procedure Laterality Date    BUNIONECTOMY Left      SECTION      COLONOSCOPY      LIMBAL STEM CELL TRANSPLANT      WI ARTHRP KNE CONDYLE&PLATU MEDIAL&LAT COMPARTMENTS Left 3/12/2024    Procedure: ARTHROPLASTY KNEE TOTAL W ROBOT (same day dc);  Surgeon: Akhil  Roger Carvajal MD;  Location: [facility];  Service: Orthopedics       Family History   Problem Relation Age of Onset    Hyperlipidemia Mother             Heart disease Mother             Thyroid disease Mother             Arthritis Mother         Osteoarthritis    Stroke Father             Hyperlipidemia Father             Heart disease Father             Diabetes Father     Heart disease Sister         Aortic valve replacement 2021    Heart disease Brother          2022    Hearing loss Brother         Hearing aides at end of life    Cancer Brother         Small cell Ca /  2018       Social History     Occupational History    Not on file   Tobacco Use    Smoking status: Former     Current packs/day: 1.00     Average packs/day: 1 pack/day for 51.2 years (51.2 ttl pk-yrs)     Types: Cigarettes     Start date: 1974     Passive exposure: Never    Smokeless tobacco: Never   Vaping Use    Vaping status: Never Used   Substance and Sexual Activity    Alcohol use: Yes     Comment: Occasionally 1 glass of wine    Drug use: Never    Sexual activity: Not Currently       Allergies   Allergen Reactions    Gluten Meal - Food Allergy Diarrhea    Bactrim [Sulfamethoxazole-Trimethoprim] Other (See Comments)     Allergy from 23 years ago, doesn't remember reaction    Doxycycline Facial Swelling    Allopurinol Rash     Prior to stem cell transplant. rash    Sulfites - Food Allergy Rash         Current Outpatient Medications:     amoxicillin (AMOXIL) 500 mg capsule, , Disp: , Rfl:     ascorbic Acid (VITAMIN C) 500 MG CPCR, Take 1 capsule (500 mg total) by mouth daily, Disp: 60 capsule, Rfl: 0    cholecalciferol (VITAMIN D3) 1,000 units tablet, Take 2,000 Units by mouth daily, Disp: , Rfl:     hydrochlorothiazide (HYDRODIURIL) 12.5 mg tablet, Take 12.5 mg by mouth daily, Disp: , Rfl:     levothyroxine 100 mcg tablet, Take 100 mcg by mouth daily, Disp: , Rfl:      Ozempic, 2 MG/DOSE, 8 MG/3ML injection pen, inject 2 mg subcutaneously weekly, Disp: , Rfl:     TURMERIC PO, Take 1 capsule by mouth daily, Disp: , Rfl:     Mounjaro 7.5 MG/0.5ML, Inject 7.5 mg under the skin every 7 days, Disp: , Rfl:       Forest Lindsey    Scribe Attestation      I,:  Forest Lindsey am acting as a scribe while in the presence of the attending physician.:       I,:  Akhil Carvajal MD personally performed the services described in this documentation    as scribed in my presence.:

## 2025-05-19 ENCOUNTER — TELEPHONE (OUTPATIENT)
Dept: GASTROENTEROLOGY | Facility: CLINIC | Age: 70
End: 2025-05-19

## 2025-06-19 ENCOUNTER — NEW PROBLEM (OUTPATIENT)
Dept: URBAN - METROPOLITAN AREA CLINIC 6 | Facility: CLINIC | Age: 70
End: 2025-06-19

## 2025-06-19 DIAGNOSIS — H00.021: ICD-10-CM

## 2025-06-19 PROCEDURE — 99204 OFFICE O/P NEW MOD 45 MIN: CPT

## 2025-06-19 ASSESSMENT — VISUAL ACUITY
OU_CC: 20/30-1
OS_CC: 20/30
OD_CC: 20/25-1

## 2025-06-19 ASSESSMENT — TONOMETRY
OD_IOP_MMHG: 9
OS_IOP_MMHG: 12

## 2025-07-01 ENCOUNTER — APPOINTMENT (OUTPATIENT)
Dept: LAB | Age: 70
End: 2025-07-01
Payer: MEDICARE

## 2025-07-01 ENCOUNTER — APPOINTMENT (OUTPATIENT)
Dept: LAB | Facility: CLINIC | Age: 70
End: 2025-07-01
Attending: FAMILY MEDICINE

## 2025-07-01 ENCOUNTER — TELEPHONE (OUTPATIENT)
Age: 70
End: 2025-07-01

## 2025-07-01 DIAGNOSIS — Z77.29 CARBON MONOXIDE EXPOSURE: Primary | ICD-10-CM

## 2025-07-01 DIAGNOSIS — Z77.098 ACCIDENTAL EXPOSURE TO CARBON MONOXIDE: ICD-10-CM

## 2025-07-01 DIAGNOSIS — Z77.098 ACCIDENTAL EXPOSURE TO CARBON MONOXIDE: Primary | ICD-10-CM

## 2025-07-01 DIAGNOSIS — Z77.29 EXPOSURE TO CARBON MONOXIDE: Primary | ICD-10-CM

## 2025-07-01 LAB — GAS + CO PNL BLDA: 2.9 % (ref 0–1.5)

## 2025-07-01 PROCEDURE — 36415 COLL VENOUS BLD VENIPUNCTURE: CPT

## 2025-07-01 PROCEDURE — 82375 ASSAY CARBOXYHB QUANT: CPT

## 2025-07-01 NOTE — TELEPHONE ENCOUNTER
Kavita & her  had Carbon monoxide in home; gas company recommend to get check. She is requesting a blood test.     Please reach out to them if/when placed.

## 2025-07-01 NOTE — TELEPHONE ENCOUNTER
Dr. Yeh,     Called patient to let them know they should schedule an appointment.     Lab orders not normally ordered without a visit.     Patients stated that they have not and are not currently experiencing any symptoms at this time.     They will call back to schedule an appointment if needed.    Thanks

## 2025-07-02 ENCOUNTER — APPOINTMENT (OUTPATIENT)
Dept: LAB | Facility: CLINIC | Age: 70
End: 2025-07-02
Payer: MEDICARE

## 2025-07-02 DIAGNOSIS — Z77.29 EXPOSURE TO CARBON MONOXIDE: ICD-10-CM

## 2025-07-02 LAB — GAS + CO PNL BLDA: 1.9 % (ref 0–1.5)

## 2025-07-02 PROCEDURE — 82375 ASSAY CARBOXYHB QUANT: CPT

## 2025-07-02 PROCEDURE — 36415 COLL VENOUS BLD VENIPUNCTURE: CPT

## 2025-07-03 ENCOUNTER — APPOINTMENT (OUTPATIENT)
Dept: LAB | Facility: CLINIC | Age: 70
End: 2025-07-03
Attending: INTERNAL MEDICINE
Payer: MEDICARE

## 2025-07-03 DIAGNOSIS — Z77.29 CARBON MONOXIDE EXPOSURE: ICD-10-CM

## 2025-07-03 LAB — GAS + CO PNL BLDA: 2.2 % (ref 0–1.5)

## 2025-07-03 PROCEDURE — 36415 COLL VENOUS BLD VENIPUNCTURE: CPT

## 2025-07-03 PROCEDURE — 82375 ASSAY CARBOXYHB QUANT: CPT

## 2025-07-07 ENCOUNTER — TELEPHONE (OUTPATIENT)
Age: 70
End: 2025-07-07

## 2025-07-07 NOTE — TELEPHONE ENCOUNTER
Pt called to report that her and her  Thaddeus are feeling much better since the carbon monoxide poisoning. She said they found out it was their furniture that had a lithium ion battery in it and this was causing the carbon monoxide poisoning.      Ailyn has 2 questions:    The last time they had blood work to check their levels, the levels were decreasing. She asked if they need to get more labs done to make sure it is still going down?    She is scheduled for a colonoscopy on Wednesday and she just wants to be be sure its okay for her to proceed with it and the carbon monoxide poisoning wont affect the medication they use to sedate her?    Please advise and call pt.     Thank you!

## (undated) DEVICE — GLOVE SRG BIOGEL 7.5

## (undated) DEVICE — GLOVE INDICATOR PI UNDERGLOVE SZ 7 BLUE

## (undated) DEVICE — BETHLEHEM UNIV TOTAL KNEE, KIT: Brand: CARDINAL HEALTH

## (undated) DEVICE — SUT STRATAFIX SPIRAL PDS PLUS 1 CTX 18 IN SXPP1A400

## (undated) DEVICE — 3M™ IOBAN™ 2 ANTIMICROBIAL INCISE DRAPE 6648EZ: Brand: IOBAN™ 2

## (undated) DEVICE — GLOVE SRG BIOGEL 8

## (undated) DEVICE — ADHESIVE SKIN HIGH VISCOSITY EXOFIN 1ML

## (undated) DEVICE — NEEDLE 18 G X 1 1/2

## (undated) DEVICE — COBAN 6 IN STERILE

## (undated) DEVICE — I DISPOSABLE MIXING BOWL AND SPATULA: Brand: HOWMEDICA, MIX-KIT

## (undated) DEVICE — ACE WRAP 6 IN UNSTERILE

## (undated) DEVICE — SAW BLADE OSCILLATING BRAZOL 167

## (undated) DEVICE — GLOVE SRG BIOGEL 6.5

## (undated) DEVICE — VELYS ROBOT DRAPE

## (undated) DEVICE — SAW BLADE RECIPROCATING 179

## (undated) DEVICE — SYRINGE 50ML LL

## (undated) DEVICE — VELYS BLADE SAW OSC 85 X 19 X 2MM

## (undated) DEVICE — DRAPE SHEET THREE QUARTER

## (undated) DEVICE — 3M™ STERI-DRAPE™ U-DRAPE 1015: Brand: STERI-DRAPE™

## (undated) DEVICE — VELYS ROBOT-ASSISTED SOLUTION ARRAY DRILL PIN 125 MM X 4 MM: Brand: VELYS

## (undated) DEVICE — SURGICAL GOWN, XL SMARTSLEEVE: Brand: CONVERTORS

## (undated) DEVICE — TIBURON SPLIT SHEET: Brand: CONVERTORS

## (undated) DEVICE — SUT STRATAFIX SPIRAL PLUS 3-0 PS-2 30 X 30 CM SXMP2B408

## (undated) DEVICE — SUT VICRYL 0 CT-1 36 IN J946H

## (undated) DEVICE — SUT VICRYL 1 CTX 36 IN J977H

## (undated) DEVICE — CEMENT MIXING SYSTEM WITH FEMORAL BREAKWAY NOZZLE: Brand: REVOLUTION

## (undated) DEVICE — SUT STRATAFIX SPIRAL MONOCRYL PLUS 2-0 CT-1 30CM SXMP1B412

## (undated) DEVICE — GLOVE INDICATOR PI UNDERGLOVE SZ 8.5 BLUE

## (undated) DEVICE — PLUMEPEN PRO 10FT

## (undated) DEVICE — DRESSING MEPILEX AG BORDER POST-OP 4 X 12 IN

## (undated) DEVICE — HOOD: Brand: T7PLUS

## (undated) DEVICE — VELYS ROBOTIC-ASSISTED SOLUTION ARRAY SET - KNEE: Brand: VELYS

## (undated) DEVICE — HANDPIECE SET WITH RETRACTABLE COAXIAL FAN SPRAY TIP AND SUCTION TUBE: Brand: INTERPULSE

## (undated) DEVICE — WEBRIL 6 IN UNSTERILE

## (undated) DEVICE — 10FR FRAZIER SUCTION HANDLE: Brand: CARDINAL HEALTH

## (undated) DEVICE — VELYS SATEL DRAPE

## (undated) DEVICE — CAPIT KNEE ATTUNE FB AOX POR

## (undated) DEVICE — IMPERVIOUS STOCKINETTE: Brand: DEROYAL

## (undated) RX ORDER — TOBRAMYCIN AND DEXAMETHASONE 1; 3 MG/ML; MG/ML: 1 SUSPENSION/ DROPS OPHTHALMIC